# Patient Record
Sex: MALE | Race: WHITE | Employment: FULL TIME | URBAN - METROPOLITAN AREA
[De-identification: names, ages, dates, MRNs, and addresses within clinical notes are randomized per-mention and may not be internally consistent; named-entity substitution may affect disease eponyms.]

---

## 2021-04-02 ENCOUNTER — HOSPITAL ENCOUNTER (OUTPATIENT)
Age: 66
Setting detail: OBSERVATION
Discharge: HOME | End: 2021-04-03
Attending: EMERGENCY MEDICINE | Admitting: FAMILY MEDICINE
Payer: COMMERCIAL

## 2021-04-02 ENCOUNTER — APPOINTMENT (OUTPATIENT)
Dept: CT IMAGING | Age: 66
End: 2021-04-02
Attending: NURSE PRACTITIONER
Payer: COMMERCIAL

## 2021-04-02 ENCOUNTER — APPOINTMENT (OUTPATIENT)
Dept: MRI IMAGING | Age: 66
End: 2021-04-02
Attending: FAMILY MEDICINE
Payer: COMMERCIAL

## 2021-04-02 ENCOUNTER — APPOINTMENT (OUTPATIENT)
Dept: CT IMAGING | Age: 66
End: 2021-04-02
Attending: EMERGENCY MEDICINE
Payer: COMMERCIAL

## 2021-04-02 ENCOUNTER — APPOINTMENT (OUTPATIENT)
Dept: NON INVASIVE DIAGNOSTICS | Age: 66
End: 2021-04-02
Attending: FAMILY MEDICINE
Payer: COMMERCIAL

## 2021-04-02 DIAGNOSIS — G45.9 TIA (TRANSIENT ISCHEMIC ATTACK): Primary | ICD-10-CM

## 2021-04-02 LAB
ALBUMIN SERPL-MCNC: 4.1 G/DL (ref 3.5–5)
ALBUMIN/GLOB SERPL: 1.1 {RATIO} (ref 1.1–2.2)
ALP SERPL-CCNC: 151 U/L (ref 45–117)
ALT SERPL-CCNC: 38 U/L (ref 12–78)
ANION GAP SERPL CALC-SCNC: 2 MMOL/L (ref 5–15)
APTT PPP: 24.3 SEC (ref 22.1–31)
AST SERPL-CCNC: 21 U/L (ref 15–37)
BASOPHILS # BLD: 0 K/UL (ref 0–0.1)
BASOPHILS NFR BLD: 1 % (ref 0–1)
BILIRUB SERPL-MCNC: 2.4 MG/DL (ref 0.2–1)
BUN SERPL-MCNC: 20 MG/DL (ref 6–20)
BUN/CREAT SERPL: 21 (ref 12–20)
CALCIUM SERPL-MCNC: 9.1 MG/DL (ref 8.5–10.1)
CHLORIDE SERPL-SCNC: 105 MMOL/L (ref 97–108)
CO2 SERPL-SCNC: 31 MMOL/L (ref 21–32)
COMMENT, HOLDF: NORMAL
CREAT SERPL-MCNC: 0.97 MG/DL (ref 0.7–1.3)
DIFFERENTIAL METHOD BLD: ABNORMAL
EOSINOPHIL # BLD: 0.1 K/UL (ref 0–0.4)
EOSINOPHIL NFR BLD: 2 % (ref 0–7)
ERYTHROCYTE [DISTWIDTH] IN BLOOD BY AUTOMATED COUNT: 12.7 % (ref 11.5–14.5)
GLOBULIN SER CALC-MCNC: 3.6 G/DL (ref 2–4)
GLUCOSE BLD STRIP.AUTO-MCNC: 139 MG/DL (ref 65–100)
GLUCOSE BLD STRIP.AUTO-MCNC: 145 MG/DL (ref 65–100)
GLUCOSE BLD STRIP.AUTO-MCNC: 99 MG/DL (ref 65–100)
GLUCOSE SERPL-MCNC: 101 MG/DL (ref 65–100)
HCT VFR BLD AUTO: 52.6 % (ref 36.6–50.3)
HGB BLD-MCNC: 17.8 G/DL (ref 12.1–17)
IMM GRANULOCYTES # BLD AUTO: 0 K/UL (ref 0–0.04)
IMM GRANULOCYTES NFR BLD AUTO: 0 % (ref 0–0.5)
INR PPP: 1 (ref 0.9–1.1)
LYMPHOCYTES # BLD: 1.2 K/UL (ref 0.8–3.5)
LYMPHOCYTES NFR BLD: 26 % (ref 12–49)
MCH RBC QN AUTO: 30.5 PG (ref 26–34)
MCHC RBC AUTO-ENTMCNC: 33.8 G/DL (ref 30–36.5)
MCV RBC AUTO: 90.2 FL (ref 80–99)
MONOCYTES # BLD: 0.5 K/UL (ref 0–1)
MONOCYTES NFR BLD: 11 % (ref 5–13)
NEUTS SEG # BLD: 2.7 K/UL (ref 1.8–8)
NEUTS SEG NFR BLD: 60 % (ref 32–75)
NRBC # BLD: 0 K/UL (ref 0–0.01)
NRBC BLD-RTO: 0 PER 100 WBC
PLATELET # BLD AUTO: 135 K/UL (ref 150–400)
PMV BLD AUTO: 10.6 FL (ref 8.9–12.9)
POTASSIUM SERPL-SCNC: 3.9 MMOL/L (ref 3.5–5.1)
PROT SERPL-MCNC: 7.7 G/DL (ref 6.4–8.2)
PROTHROMBIN TIME: 10.2 SEC (ref 9–11.1)
RBC # BLD AUTO: 5.83 M/UL (ref 4.1–5.7)
SAMPLES BEING HELD,HOLD: NORMAL
SERVICE CMNT-IMP: ABNORMAL
SERVICE CMNT-IMP: ABNORMAL
SERVICE CMNT-IMP: NORMAL
SODIUM SERPL-SCNC: 138 MMOL/L (ref 136–145)
THERAPEUTIC RANGE,PTTT: NORMAL SECS (ref 58–77)
TROPONIN I SERPL-MCNC: <0.05 NG/ML
TROPONIN I SERPL-MCNC: <0.05 NG/ML
WBC # BLD AUTO: 4.4 K/UL (ref 4.1–11.1)

## 2021-04-02 PROCEDURE — 84484 ASSAY OF TROPONIN QUANT: CPT

## 2021-04-02 PROCEDURE — 70496 CT ANGIOGRAPHY HEAD: CPT

## 2021-04-02 PROCEDURE — 99218 HC RM OBSERVATION: CPT

## 2021-04-02 PROCEDURE — 0042T CT CODE NEURO PERF W CBF: CPT

## 2021-04-02 PROCEDURE — 36415 COLL VENOUS BLD VENIPUNCTURE: CPT

## 2021-04-02 PROCEDURE — 99285 EMERGENCY DEPT VISIT HI MDM: CPT

## 2021-04-02 PROCEDURE — 74011000636 HC RX REV CODE- 636: Performed by: RADIOLOGY

## 2021-04-02 PROCEDURE — 80053 COMPREHEN METABOLIC PANEL: CPT

## 2021-04-02 PROCEDURE — 85025 COMPLETE CBC W/AUTO DIFF WBC: CPT

## 2021-04-02 PROCEDURE — 99245 OFF/OP CONSLTJ NEW/EST HI 55: CPT | Performed by: PSYCHIATRY & NEUROLOGY

## 2021-04-02 PROCEDURE — 74011250636 HC RX REV CODE- 250/636: Performed by: FAMILY MEDICINE

## 2021-04-02 PROCEDURE — 97161 PT EVAL LOW COMPLEX 20 MIN: CPT

## 2021-04-02 PROCEDURE — 70450 CT HEAD/BRAIN W/O DYE: CPT

## 2021-04-02 PROCEDURE — 82962 GLUCOSE BLOOD TEST: CPT

## 2021-04-02 PROCEDURE — 93306 TTE W/DOPPLER COMPLETE: CPT

## 2021-04-02 PROCEDURE — 85730 THROMBOPLASTIN TIME PARTIAL: CPT

## 2021-04-02 PROCEDURE — 85610 PROTHROMBIN TIME: CPT

## 2021-04-02 PROCEDURE — 74011250637 HC RX REV CODE- 250/637: Performed by: FAMILY MEDICINE

## 2021-04-02 PROCEDURE — 97116 GAIT TRAINING THERAPY: CPT

## 2021-04-02 PROCEDURE — 74011250637 HC RX REV CODE- 250/637: Performed by: EMERGENCY MEDICINE

## 2021-04-02 RX ORDER — ACETAMINOPHEN 650 MG/1
650 SUPPOSITORY RECTAL
Status: DISCONTINUED | OUTPATIENT
Start: 2021-04-02 | End: 2021-04-03 | Stop reason: HOSPADM

## 2021-04-02 RX ORDER — ATORVASTATIN CALCIUM 20 MG/1
80 TABLET, FILM COATED ORAL
Status: DISCONTINUED | OUTPATIENT
Start: 2021-04-02 | End: 2021-04-03 | Stop reason: HOSPADM

## 2021-04-02 RX ORDER — ONDANSETRON 2 MG/ML
4 INJECTION INTRAMUSCULAR; INTRAVENOUS
Status: DISCONTINUED | OUTPATIENT
Start: 2021-04-02 | End: 2021-04-03 | Stop reason: HOSPADM

## 2021-04-02 RX ORDER — LORAZEPAM 2 MG/ML
0.5 INJECTION INTRAMUSCULAR ONCE
Status: COMPLETED | OUTPATIENT
Start: 2021-04-02 | End: 2021-04-03

## 2021-04-02 RX ORDER — SERTRALINE HYDROCHLORIDE 50 MG/1
50 TABLET, FILM COATED ORAL DAILY
Status: DISCONTINUED | OUTPATIENT
Start: 2021-04-03 | End: 2021-04-03 | Stop reason: HOSPADM

## 2021-04-02 RX ORDER — ZOLPIDEM TARTRATE 5 MG/1
2.5 TABLET ORAL
Status: DISCONTINUED | OUTPATIENT
Start: 2021-04-02 | End: 2021-04-03 | Stop reason: HOSPADM

## 2021-04-02 RX ORDER — ASPIRIN 325 MG
325 TABLET ORAL ONCE
Status: COMPLETED | OUTPATIENT
Start: 2021-04-02 | End: 2021-04-02

## 2021-04-02 RX ORDER — ACETAMINOPHEN 325 MG/1
650 TABLET ORAL
Status: DISCONTINUED | OUTPATIENT
Start: 2021-04-02 | End: 2021-04-03 | Stop reason: HOSPADM

## 2021-04-02 RX ORDER — GUAIFENESIN 100 MG/5ML
81 LIQUID (ML) ORAL DAILY
Status: DISCONTINUED | OUTPATIENT
Start: 2021-04-03 | End: 2021-04-03 | Stop reason: HOSPADM

## 2021-04-02 RX ORDER — FAMOTIDINE 20 MG/1
20 TABLET, FILM COATED ORAL EVERY 12 HOURS
Status: DISCONTINUED | OUTPATIENT
Start: 2021-04-02 | End: 2021-04-03 | Stop reason: HOSPADM

## 2021-04-02 RX ORDER — SODIUM CHLORIDE 9 MG/ML
75 INJECTION, SOLUTION INTRAVENOUS CONTINUOUS
Status: DISCONTINUED | OUTPATIENT
Start: 2021-04-02 | End: 2021-04-03 | Stop reason: HOSPADM

## 2021-04-02 RX ADMIN — ASPIRIN 325 MG ORAL TABLET 325 MG: 325 PILL ORAL at 11:55

## 2021-04-02 RX ADMIN — FAMOTIDINE 20 MG: 20 TABLET, FILM COATED ORAL at 21:28

## 2021-04-02 RX ADMIN — ZOLPIDEM TARTRATE 2.5 MG: 5 TABLET ORAL at 22:32

## 2021-04-02 RX ADMIN — IOPAMIDOL 40 ML: 755 INJECTION, SOLUTION INTRAVENOUS at 10:08

## 2021-04-02 RX ADMIN — ATORVASTATIN CALCIUM 80 MG: 20 TABLET, FILM COATED ORAL at 21:28

## 2021-04-02 RX ADMIN — FAMOTIDINE 20 MG: 20 TABLET, FILM COATED ORAL at 16:39

## 2021-04-02 RX ADMIN — SODIUM CHLORIDE 75 ML/HR: 9 INJECTION, SOLUTION INTRAVENOUS at 15:27

## 2021-04-02 RX ADMIN — IOPAMIDOL 80 ML: 755 INJECTION, SOLUTION INTRAVENOUS at 10:08

## 2021-04-02 NOTE — PROGRESS NOTES
Occupational Therapy    Chart reviewed, consulted with physical therapist, who evaluated patient and advised that he is at independent functional baseline. OT evaluation is not indicated. Will complete order.     Miguel Branham OTR/L

## 2021-04-02 NOTE — H&P
History & Physical    Primary Care Provider: Giovana Brink MD  Source of Information: Patient     History of Presenting Illness:   Joao Alejandro is a 63 y.o. male who presents with right-sided weakness    Patient reports that he has had some trouble sleeping in the last few months, takes melatonin on a daily basis. Today morning he woke up at 530, had a snack to eat, post that slept back again, woke up and started having some right-sided heaviness. Patient reports that he felt his right hand and leg were not moving the way they should, felt it to be heavy and incoordinated. Patient also reports that he had some dizziness associated with his symptoms. He got concerned and decided to come to the hospital.  Patient reports he also felt off balance this morning. Patient came to the ER and was requested to be admitted to the hospital service. Patient reports he had similar symptoms about 2 weeks ago. The patient denies any Headache, blurry vision, sore throat, trouble swallowing, trouble with speech, chest pain, SOB, cough, fever, chills, N/V/D, abd pain, urinary symptoms, constipation, recent travels, sick contacts, falls, injuries, rashes, contact with COVID-19 diagnosed patients, hematemesis, melena, hemoptysis, hematuria, rashes, denies starting any new medications and denies any other concerns or problems besides as mentioned above. Review of Systems:  Pertinent items are noted in the History of Present Illness.    All other systems reviewed, pertinent positives and negatives noted in HPI    Past Medical History:   Diagnosis Date    Anxiety     ED (erectile dysfunction)     Gilbert's syndrome     Gout     Laceration     Lactose intolerance     Plantar fasciitis     Prostatitis     Trauma     rt eye injury       Past Surgical History:   Procedure Laterality Date    ENDOSCOPY, COLON, DIAGNOSTIC  2007    flex sigmoidoscopy , at 46 [facility] HX COLONOSCOPY  04[place]15/[place]    [place] , Dr. Batsheva Bansal HX HEENT      septoplasty; sinus surgery . rt eye sgy for trauma    HX ORTHOPAEDIC  1975    rt knee open meniscectomy    HX UROLOGICAL  04/2016    TURB -T benign tumor     Prior to Admission medications    Medication Sig Start Date End Date Taking? Authorizing Provider   sertraline (ZOLOFT) 50 mg tablet Take 1 Tab by mouth daily. 7/20/18   Deric Bui IV, MD   sildenafil, antihypertensive, (REVATIO) 20 mg tablet Take 20 mg by mouth daily as needed. Provider, Historical   melatonin 3 mg tablet Take 1.5 mg by mouth. Every other night    Provider, Historical   therapeutic multivitamin (THERAGRAN) tablet Take 1 Tab by mouth daily. Provider, Historical     Allergies   Allergen Reactions    Celexa [Citalopram] Other (comments)     tinnitus    Cialis [Tadalafil] Other (comments)     Dizziness     Effexor [Venlafaxine] Other (comments)     Suppressed appetite     Levitra [Vardenafil] Other (comments)     Malaise     Prozac [Fluoxetine] Other (comments)     drowsiness    Sulfa (Sulfonamide Antibiotics) Other (comments)     Abdominal pain    Viagra [Sildenafil] Other (comments)     Tinnitus       Family History   Problem Relation Age of Onset    Heart Disease Father         Popliteal aneurysm.     Stroke Father         TIAs     Cancer Father         prostate     SOCIAL HISTORY:  Patient resides:  Independently x   Assisted Living    SNF    With family care       Smoking history:   None x   Former    Chronic      Alcohol history:   None x   Social    Chronic      Ambulates:   Independently x   w/cane    w/walker    w/wc    CODE STATUS:  DNR    Full x   Other      Objective:     Physical Exam:     Visit Vitals  /81   Pulse 65   Temp 97.1 °F (36.2 °C)   Resp 23   SpO2 97%      O2 Device: Room air    General : alert x 3, awake, no acute distress, resting in bed, pleasant male, appears to be stated age  HEENT: PEERL, EOMI, moist mucus membrane, TM clear  Neck: supple, no JVD, no meningeal signs  Chest: Clear to auscultation bilaterally   CVS: S1 S2 heard, Capillary refill less than 2 seconds  Abd: soft/ Non tender, non distended, BS physiological,   Ext: no clubbing, no cyanosis, no edema, brisk 2+ DP pulses  Neuro/Psych: pleasant mood and affect, CN 2-12 grossly intact, sensory grossly within normal limit, Strength 5/5 in all extremities, DTR 1+ x 4  Skin: warm     EKG: Normal sinus rhythm with nonspecific ST changes      Data Review:     Recent Days:  Recent Labs     04/02/21  1010   WBC 4.4   HGB 17.8*   HCT 52.6*   *     Recent Labs     04/02/21  1010      K 3.9      CO2 31   *   BUN 20   CREA 0.97   CA 9.1   ALB 4.1   ALT 38   INR 1.0     No results for input(s): PH, PCO2, PO2, HCO3, FIO2 in the last 72 hours. 24 Hour Results:  Recent Results (from the past 24 hour(s))   GLUCOSE, POC    Collection Time: 04/02/21  9:30 AM   Result Value Ref Range    Glucose (POC) 145 (H) 65 - 100 mg/dL    Performed by JAYA ARVIZU    CBC WITH AUTOMATED DIFF    Collection Time: 04/02/21 10:10 AM   Result Value Ref Range    WBC 4.4 4.1 - 11.1 K/uL    RBC 5.83 (H) 4.10 - 5.70 M/uL    HGB 17.8 (H) 12.1 - 17.0 g/dL    HCT 52.6 (H) 36.6 - 50.3 %    MCV 90.2 80.0 - 99.0 FL    MCH 30.5 26.0 - 34.0 PG    MCHC 33.8 30.0 - 36.5 g/dL    RDW 12.7 11.5 - 14.5 %    PLATELET 817 (L) 485 - 400 K/uL    MPV 10.6 8.9 - 12.9 FL    NRBC 0.0 0  WBC    ABSOLUTE NRBC 0.00 0.00 - 0.01 K/uL    NEUTROPHILS 60 32 - 75 %    LYMPHOCYTES 26 12 - 49 %    MONOCYTES 11 5 - 13 %    EOSINOPHILS 2 0 - 7 %    BASOPHILS 1 0 - 1 %    IMMATURE GRANULOCYTES 0 0.0 - 0.5 %    ABS. NEUTROPHILS 2.7 1.8 - 8.0 K/UL    ABS. LYMPHOCYTES 1.2 0.8 - 3.5 K/UL    ABS. MONOCYTES 0.5 0.0 - 1.0 K/UL    ABS. EOSINOPHILS 0.1 0.0 - 0.4 K/UL    ABS. BASOPHILS 0.0 0.0 - 0.1 K/UL    ABS. IMM.  GRANS. 0.0 0.00 - 0.04 K/UL    DF AUTOMATED     METABOLIC PANEL, COMPREHENSIVE    Collection Time: 04/02/21 10:10 AM   Result Value Ref Range    Sodium 138 136 - 145 mmol/L    Potassium 3.9 3.5 - 5.1 mmol/L    Chloride 105 97 - 108 mmol/L    CO2 31 21 - 32 mmol/L    Anion gap 2 (L) 5 - 15 mmol/L    Glucose 101 (H) 65 - 100 mg/dL    BUN 20 6 - 20 MG/DL    Creatinine 0.97 0.70 - 1.30 MG/DL    BUN/Creatinine ratio 21 (H) 12 - 20      GFR est AA >60 >60 ml/min/1.73m2    GFR est non-AA >60 >60 ml/min/1.73m2    Calcium 9.1 8.5 - 10.1 MG/DL    Bilirubin, total 2.4 (H) 0.2 - 1.0 MG/DL    ALT (SGPT) 38 12 - 78 U/L    AST (SGOT) 21 15 - 37 U/L    Alk. phosphatase 151 (H) 45 - 117 U/L    Protein, total 7.7 6.4 - 8.2 g/dL    Albumin 4.1 3.5 - 5.0 g/dL    Globulin 3.6 2.0 - 4.0 g/dL    A-G Ratio 1.1 1.1 - 2.2     PROTHROMBIN TIME + INR    Collection Time: 04/02/21 10:10 AM   Result Value Ref Range    INR 1.0 0.9 - 1.1      Prothrombin time 10.2 9.0 - 11.1 sec   PTT    Collection Time: 04/02/21 10:10 AM   Result Value Ref Range    aPTT 24.3 22.1 - 31.0 sec    aPTT, therapeutic range     58.0 - 77.0 SECS   TROPONIN I    Collection Time: 04/02/21 10:10 AM   Result Value Ref Range    Troponin-I, Qt. <0.05 <0.05 ng/mL   SAMPLES BEING HELD    Collection Time: 04/02/21 10:10 AM   Result Value Ref Range    SAMPLES BEING HELD 1red     COMMENT        Add-on orders for these samples will be processed based on acceptable specimen integrity and analyte stability, which may vary by analyte. Imaging:   Cta Code Neuro Head And Neck W Cont    Result Date: 4/2/2021  1. No significant perfusion abnormality. 2. Negative CT angiography of the neck and head. Ct Code Neuro Head Wo Contrast    Result Date: 4/2/2021  No evidence of acute process. Ct Code Neuro Perf W Cbf    Result Date: 4/2/2021  1. No significant perfusion abnormality. 2. Negative CT angiography of the neck and head.     Assessment/Plan     TIA: Patient will be observed on a telemetry bed, MRI brain, neurovascular checks, CT and CTA negative for acute pathology, neurology consult, aspirin, statin, PT OT speech consult, supportive care, close monitoring, echocardiogram and further intervention per hospitalist, reassess as needed    Insomnia: Add Ambien as needed nightly. GI DVT prophylaxis: Patient will be on SCD's             Please note that this dictation was completed with meets, the computer voice recognition software. Quite often unanticipated grammatical, syntax, homophones, and other interpretive errors are inadvertently transcribed by the computer software. Please disregard these errors. Please excuse any errors that have escaped final proofreading.          Signed By: Wilma Cagle MD     April 2, 2021

## 2021-04-02 NOTE — ADVANCED PRACTICE NURSE
Neurocritical Care Code Stroke Documentation    Symptoms:   Right arm/leg weakness and ataxia , dizziness   Last Known Well: 1030 2021   Medical hx: Active Problems:    * No active hospital problems. *     Anticoagulation: None   VAN:   Positive/Negative   NIHSS:   1a-LOC:0    1b-Month/Age:0    1c-Open/Close Hand:0    2-Best Gaze:0    3-Visual Fields:0    4-Facial Palsy:0    5a-Left Arm:0    5b-Right Arm:0    6a-Left Le    6b-Right Le    7-Limb Ataxia:0    8-Sensory:0    9-Best Language:0    10-Dysarthria:0    11-Extinction/Inattention:0  TOTAL SCORE:0   Imaging:   CT- IMPRESSION  No evidence of acute process.       CTA    CTP   Plan:   TPA Candidate: NO    Mechanical thrombectomy Candidate: NO     Discussed with: Dr. Carmelita Adan in the ED, bedside RN and patient     Patient with symptoms of right arm and leak heaviness that he woke up with at 5 AM. reprots that this also happened two weeks ago , but at 745 AM today the symptoms had not resolved so he decided to come to the ED. He also feels off balance and \"foggy. \" Recommend CTA Head/neck and CTP as well as aspirin. Time spent: 20 minutes.      Paz Acosta NP  Neurocritical Care Nurse Practitioner  [phone]

## 2021-04-02 NOTE — PROGRESS NOTES
SLP Contact Note    Consult received and appreciated. Patient chart reviewed. CT negative for acute infarct. Pt passed STAND. Will await MRI and complete evaluation if indicated.       Thank you,  Jose English M.Ed, Kenna Almanza  Speech-Language Pathologist

## 2021-04-02 NOTE — PROGRESS NOTES
Admission Medication Reconciliation: In progress:    Unable to speak with patient face to face at this time due to general isolation precautions in the ED related to COVID-19 pandemic. Patient's voicemail is full. Left voicemail for wife Kuldeep Garcia) @ [phone], awaiting return call and will update once additional information becomes available. Thank you for allowing me to participate in the care of your patient. Mark Williamson PharmD, RN # [phone]       [facility] pharmacy benefit data reflects medications filled and processed through the patient's insurance, however   this data does NOT capture whether the medication was picked up or is currently being taken by the patient. Allergies:  Celexa [citalopram], Cialis [tadalafil], Effexor [venlafaxine], Levitra [vardenafil], Prozac [fluoxetine], Sulfa (sulfonamide antibiotics), and Viagra [sildenafil]    Significant PMH/Disease States:   Past Medical History:   Diagnosis Date    Anxiety     ED (erectile dysfunction)     Gilbert's syndrome     Gout     Laceration     Lactose intolerance     Plantar fasciitis     Prostatitis     Trauma     rt eye injury      Chief Complaint for this Admission:    Chief Complaint   Patient presents with    Extremity Weakness    Dizziness     Prior to Admission Medications:   Prior to Admission Medications   Prescriptions Last Dose Informant Taking?   melatonin 3 mg tablet   No   Sig: Take 1.5 mg by mouth. Every other night   sertraline (ZOLOFT) 50 mg tablet   No   Sig: Take 1 Tab by mouth daily. sildenafil, antihypertensive, (REVATIO) 20 mg tablet   No   Sig: Take 20 mg by mouth daily as needed. therapeutic multivitamin (THERAGRAN) tablet   No   Sig: Take 1 Tab by mouth daily. Facility-Administered Medications: None     Please contact the main inpatient pharmacy with any questions or concerns at [phone] and we will direct you to the clinical pharmacist covering this patient's care while in-house. [facility], [place]

## 2021-04-02 NOTE — PROGRESS NOTES
Problem: TIA/CVA Stroke: 0-24 hours  Goal: Activity/Safety  Outcome: Progressing Towards Goal  Goal: Consults, if ordered  Outcome: Progressing Towards Goal  Goal: Diagnostic Test/Procedures  Outcome: Progressing Towards Goal  Goal: Nutrition/Diet  Outcome: Progressing Towards Goal  Goal: Discharge Planning  Outcome: Progressing Towards Goal  Goal: Medications  Outcome: Progressing Towards Goal  Goal: Respiratory  Outcome: Progressing Towards Goal  Goal: Treatments/Interventions/Procedures  Outcome: Progressing Towards Goal  Goal: Minimize risk of bleeding post-thrombolytic infusion  Outcome: Progressing Towards Goal  Goal: Monitor for complications post-thrombolytic infusion  Outcome: Progressing Towards Goal  Goal: Psychosocial  Outcome: Progressing Towards Goal  Goal: *Hemodynamically stable  Outcome: Progressing Towards Goal  Goal: *Neurologically stable  Description: Absence of additional neurological deficits    Outcome: Progressing Towards Goal  Goal: *Verbalizes anxiety and depression are reduced or absent  Outcome: Progressing Towards Goal  Goal: *Absence of Signs of Aspiration on Current Diet  Outcome: Progressing Towards Goal  Goal: *Absence of deep venous thrombosis signs and symptoms(Stroke Metric)  Outcome: Progressing Towards Goal  Goal: *Ability to perform ADLs and demonstrates progressive mobility and function  Outcome: Progressing Towards Goal  Goal: *Stroke education started(Stroke Metric)  Outcome: Progressing Towards Goal  Goal: *Dysphagia screen performed(Stroke Metric)  Outcome: Progressing Towards Goal  Goal: *Rehab consulted(Stroke Metric)  Outcome: Progressing Towards Goal

## 2021-04-02 NOTE — PROGRESS NOTES
TRANSFER - IN REPORT:    Verbal report received from NANCY Steel(name) on Mervat Schulz  being received from ED(unit) for routine progression of care      Report consisted of patients Situation, Background, Assessment and   Recommendations(SBAR). Information from the following report(s) SBAR, Kardex, ED Summary, Recent Results, Med Rec Status and Cardiac Rhythm NRS was reviewed with the receiving nurse. Opportunity for questions and clarification was provided. Assessment completed upon patients arrival to unit and care assumed.

## 2021-04-02 NOTE — PROGRESS NOTES
PHYSICAL THERAPY EVALUATION WITH DISCHARGE  Patient: Ramo Arroyo (41 y.o. male)  Date: 4/2/2021  Primary Diagnosis: TIA (transient ischemic attack) [G45.9]       Precautions:          ASSESSMENT  Based on the objective data described below, the patient presents with baseline level of independence following admission for TIA 2/2 R sided heaviness and \"clumsiness\" early this AM. Pt reports symptoms have resolved in extremities though continues to feel \"light. \" BP stable with positional changes. Independent with intact balance, full strength and no sensation or coordination deficits. Ambulates independently without AD and low fall risk. Educated on BE FAST. CT negative awaiting MRI. Functional Outcome Measure: The patient scored Total: 55/56 on the Cedeno Balance Assessment which is indicative of low fall risk. Other factors to consider for discharge: pending MRI     Further skilled acute physical therapy is not indicated at this time. PLAN :  Recommendation for discharge: (in order for the patient to meet his/her long term goals)  No skilled physical therapy/ follow up rehabilitation needs identified at this time. This discharge recommendation:  Has been made in collaboration with the attending provider and/or case management    IF patient discharges home will need the following DME: none         SUBJECTIVE:   Patient stated I just feel like I'm floating.     OBJECTIVE DATA SUMMARY:   HISTORY:    Past Medical History:   Diagnosis Date    Anxiety     ED (erectile dysfunction)     Gilbert's syndrome     Gout     Laceration     Lactose intolerance     Plantar fasciitis     Prostatitis     Trauma     rt eye injury      Past Surgical History:   Procedure Laterality Date    ENDOSCOPY, COLON, DIAGNOSTIC  2007    flex sigmoidoscopy , at 46 [facility] HX COLONOSCOPY  04/15/2016    [place] , Dr. Doug Rae HX HEENT      septoplasty; sinus surgery .  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, drives  Personal factors and/or comorbidities impacting plan of care: gout    Home Situation  Home Environment: Private residence  # Steps to Enter: 3  Rails to Enter: No  One/Two Story Residence: One story  Living Alone: No  Support Systems: Spouse/Significant Other/Partner  Patient Expects to be Discharged to[de-identified] Private residence  Current DME Used/Available at Home: None  Tub or Shower Type: Tub    EXAMINATION/PRESENTATION/DECISION MAKING:   Critical Behavior:  Neurologic State: Alert  Orientation Level: Oriented X4  Cognition: Appropriate decision making, Appropriate for age attention/concentration, Appropriate safety awareness     Hearing:     Skin:    Edema:   Range Of Motion:  AROM: Within functional limits           PROM: Within functional limits           Strength:    Strength: Within functional limits                    Tone & Sensation:   Tone: Normal              Sensation: Intact               Coordination:  Coordination: Within functional limits  Vision:      Functional Mobility:  Bed Mobility:              Transfers:  Sit to Stand: Independent  Stand to Sit: Independent  Stand Pivot Transfers: Independent                    Balance:   Sitting: Intact  Standing: Intact  Ambulation/Gait Training:  Distance (ft): 300 Feet (ft)     Ambulation - Level of Assistance: Independent                                                Stairs:               Therapeutic Exercises:       Functional Measure  Cedeno Balance Test:    Sitting to Standin  Standing Unsupported: 4  Sitting with Back Unsupported: 4  Standing to Sittin  Transfers: 4  Standing Unsupported with Eyes Closed: 4  Standing Unsupported with Feet Together: 4  Reach Forward with Outstretched Arm: 4   Object: 4  Turn to Look Over Shoulders: 4  Turn 360 Degrees: 4  Alternate Foot on Step/Stool: 4  Standing Unsupported One Foot in Front: 4  Stand on One Leg: 3  Total: 55/56         56=Maximum possible score;   0-20=High fall risk  21-40=Moderate fall risk   41-56=Low fall risk        Physical Therapy Evaluation Charge Determination   History Examination Presentation Decision-Making   MEDIUM  Complexity : 1-2 comorbidities / personal factors will impact the outcome/ POC  MEDIUM Complexity : 3 Standardized tests and measures addressing body structure, function, activity limitation and / or participation in recreation  LOW Complexity : Stable, uncomplicated  Other outcome measures Cedeno  LOW       Based on the above components, the patient evaluation is determined to be of the following complexity level: LOW     Pain Rating:  none    Activity Tolerance:   Good    After treatment patient left in no apparent distress:   Sitting in chair and Call bell within reach    COMMUNICATION/EDUCATION:   The patients plan of care was discussed with: Registered nurse. Patient was educated regarding His deficit(s) of heaviness in R side as this relates to His diagnosis of TIA. He demonstrated Excellent understanding as evidenced by recall. Patient and/or family was verbally educated on the BE FAST acronym for signs/symptoms of CVA and TIA. BE FAST was written on patient's communication board  for visual education and reinforcement. All questions answered with patient indicating excellent understanding. Fall prevention education was provided and the patient/caregiver indicated understanding., Patient/family have participated as able in goal setting and plan of care. and Patient/family agree to work toward stated goals and plan of care.     Thank you for this referral.  Melva Sena, PT   Time Calculation: 20 mins

## 2021-04-02 NOTE — CONSULTS
INPATIENT NEUROLOGY CONSULTATION  4/2/2021     Consulted by: Radha Saldivar MD        Patient ID:  Kendrick Anderson  [id number]  63 y.o.  1955    Chief Complaint   Patient presents with    Extremity Weakness    Dizziness       HPI    Mr. Wu Gillespie is a 60-year-old gentleman with a history of anxiety and testosterone deficiency here because this morning he noticed when he was awake in the middle night the right arm and leg did not want to work very well. It was kind of clumsy. He went back to sleep and when he woke up again he was still feeling this and also had some dizziness which is unusual.  He got up and noticed his right side was heavy. He had no speech change. No difficulty with vision. He came to the hospital.  Since being here the symptoms are better. Not resolved. He says 2 weeks ago he had a very similar event but no dizziness and less intense and resolved after short period of time but he cannot tell me how long. He does not take aspirin. CT CTA unrevealing at the moment. Non-smoker, 1 beer nightly. 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 Review of Systems   Eyes: Negative for double vision. Neurological: Positive for dizziness and focal weakness. Negative for headaches. All other systems reviewed and are negative. Past Medical History:   Diagnosis Date    Anxiety     ED (erectile dysfunction)     Gilbert's syndrome     Gout     Laceration     Lactose intolerance     Plantar fasciitis     Prostatitis     Trauma     rt eye injury      Family History   Problem Relation Age of Onset    Heart Disease Father         Popliteal aneurysm.     Stroke Father         TIAs     Cancer Father         prostate     Social History     Socioeconomic History    Marital status:      Spouse name: Not on file    Number of children: Not on file    Years of education: Not on file    Highest education level: Not on file   Occupational History    Not on file   Social Needs    Financial resource strain: Not on file    Food insecurity     Worry: Not on file     Inability: Not on file    Transportation needs     Medical: Not on file     Non-medical: Not on file   Tobacco Use    Smoking status: Never Smoker    Smokeless tobacco: Never Used   Substance and Sexual Activity    Alcohol use: Yes     Comment: 1 beer /month    Drug use: No    Sexual activity: Not on file   Lifestyle    Physical activity     Days per week: Not on file     Minutes per session: Not on file    Stress: Not on file   Relationships    Social connections     Talks on phone: Not on file     Gets together: Not on file     Attends Rastafari service: Not on file     Active member of club or organization: Not on file     Attends meetings of clubs or organizations: Not on file     Relationship status: Not on file    Intimate partner violence     Fear of current or ex partner: Not on file     Emotionally abused: Not on file     Physically abused: Not on file     Forced sexual activity: Not on file   Other Topics Concern    Not on file   Social History Narrative    Not on file     Current Facility-Administered Medications   Medication Dose Route Frequency    [START ON 4/3/2021] sertraline (ZOLOFT) tablet 50 mg  50 mg Oral DAILY    acetaminophen (TYLENOL) tablet 650 mg  650 mg Oral Q4H PRN    Or    acetaminophen (TYLENOL) solution 650 mg  650 mg Per NG tube Q4H PRN    Or    acetaminophen (TYLENOL) suppository 650 mg  650 mg Rectal Q4H PRN    0.9% sodium chloride infusion  75 mL/hr IntraVENous CONTINUOUS    ondansetron (ZOFRAN) injection 4 mg  4 mg IntraVENous Q6H PRN    [START ON 4/3/2021] aspirin chewable tablet 81 mg  81 mg Oral DAILY    atorvastatin (LIPITOR) tablet 80 mg  80 mg Oral QHS    famotidine (PEPCID) tablet 20 mg  20 mg Oral Q12H    zolpidem (AMBIEN) tablet 2.5 mg  2.5 mg Oral QHS PRN     Current Outpatient Medications   Medication Sig    sertraline (ZOLOFT) 50 mg tablet Take 1 Tab by mouth daily.  sildenafil, antihypertensive, (REVATIO) 20 mg tablet Take 20 mg by mouth daily as needed.  melatonin 3 mg tablet Take 1.5 mg by mouth. Every other night    therapeutic multivitamin (THERAGRAN) tablet Take 1 Tab by mouth daily. Allergies   Allergen Reactions    Celexa [Citalopram] Other (comments)     tinnitus    Cialis [Tadalafil] Other (comments)     Dizziness     Effexor [Venlafaxine] Other (comments)     Suppressed appetite     Levitra [Vardenafil] Other (comments)     Malaise     Prozac [Fluoxetine] Other (comments)     drowsiness    Sulfa (Sulfonamide Antibiotics) Other (comments)     Abdominal pain    Viagra [Sildenafil] Other (comments)     Tinnitus        Visit Vitals  /81   Pulse 65   Temp 97.1 °F (36.2 °C)   Resp 23   SpO2 97%     Physical Exam   Constitutional: He appears well-developed and well-nourished. Cardiovascular: Normal rate. Vitals reviewed. Neurologic Exam     Mental Status   Adult gentleman in bed awake and alert. Follows commands well.   Pupils are equal, EOMI without nystagmus  Face is grossly symmetric on smile tongue is midline speech is clear without aphasia  Strength is 5/5 on the left but 4+ on the right arm and leg  Sensation grossly intact  Finger-to-nose intact bilaterally  Gait steady            Lab Results   Component Value Date/Time    WBC 4.4 04/02/2021 10:10 AM    HGB 17.8 (H) 04/02/2021 10:10 AM    HCT 52.6 (H) 04/02/2021 10:10 AM    PLATELET 190 (L) 41/00/8446 10:10 AM    MCV 90.2 04/02/2021 10:10 AM     Lab Results   Component Value Date/Time    Glucose 101 (H) 04/02/2021 10:10 AM    Glucose (POC) 145 (H) 04/02/2021 09:30 AM    LDL, calculated 93 04/02/2015 03:25 PM    Creatinine 0.97 04/02/2021 10:10 AM      Lab Results   Component Value Date/Time    Cholesterol, total 168 04/02/2015 03:25 PM    HDL Cholesterol 51 04/02/2015 03:25 PM    LDL, calculated 93 04/02/2015 03:25 PM    Triglyceride 122 04/02/2015 03:25 PM Lab Results   Component Value Date/Time    ALT (SGPT) 38 04/02/2021 10:10 AM    Alk. phosphatase 151 (H) 04/02/2021 10:10 AM    Bilirubin, direct 0.29 10/05/2016 01:42 PM    Bilirubin, total 2.4 (H) 04/02/2021 10:10 AM    Albumin 4.1 04/02/2021 10:10 AM    Protein, total 7.7 04/02/2021 10:10 AM    INR 1.0 04/02/2021 10:10 AM    Prothrombin time 10.2 04/02/2021 10:10 AM    PLATELET 837 (L) 34/14/2244 10:10 AM        CT Results (maximum last 3): Results from Hospital Encounter encounter on 04/02/21   CT CODE NEURO PERF W CBF    Narrative EXAM:  CTA CODE NEURO HEAD AND NECK W CONT, CT CODE NEURO PERF W CBF    INDICATION:  level 2 stroke    COMPARISON:  Noncontrast head CT of earlier in the day    TECHNIQUE:    Multislice helical axial CT angiography was performed from the aortic arch to  the top of the head during uneventful rapid bolus intravenous administration of  120 mL of Isovue 370. Postprocessing was performed to include MIP and  reformatted images. Standard images of the head were also obtained following  contrast administration and a delay. CT brain perfusion was performed with generation of hemodynamic maps of multiple  parameters, including cerebral blood flow, cerebral volume, Tmax and MTT (mean  transit time). This study was analyzed by the [address] ai algorithm. CT dose reduction was achieved through the use of a standardized protocol  tailored for this examination and automatic exposure control for dose  modulation. FINDINGS:    CT PERFUSION:  Perfusion images are compromised by patient motion. There is no significant area  of abnormal perfusion. Artifactual areas of elevated T. Max are seen in the  right temporal and left parietal lobes. rCBF < 30% = 0 cc. Tmax > 6 seconds = 9 cc. Head CT:  The ventricles are normal in size and midline. .  There is no intracranial  hemorrhage or evolving infarct. . There is no abnormal enhancement.  . .    CTA NECK:    Great vessels: Normal arch anatomy with the origins patent. Right subclavian artery: Patent    Left subclavian artery: Patent     Right common carotid artery: Patent     Left common carotid artery: Patent     Cervical right internal carotid artery: Patent with no significant stenosis by  NASCET criteria. Cervical left internal carotid artery: Patent with no significant stenosis by  NASCET criteria. Right vertebral artery: Patent and dominant. Left vertebral artery: Patent. Developmentally small. The lung apices are clear. The thyroid is homogeneous. No cervical  lymphadenopathy. CTA HEAD:    Right cavernous internal carotid artery: Patent    Left cavernous internal carotid artery: Patent    Anterior cerebral arteries: Patent    Anterior communicating artery: Patent    Right middle cerebral artery: Patent    Left middle cerebral artery: Patent    Posterior cerebral arteries: Patent    Posterior communicating arteries: Bilaterally patent. Small in size. Basilar artery: Patent    Distal vertebral arteries: Bilaterally patent. Right vertebral artery is  dominant and left vertebral artery has a small contribution to the basilar  artery. Major venous sinus and deep venous system: Patent. Aneurysm: None. Impression 1. No significant perfusion abnormality. 2. Negative CT angiography of the neck and head. CTA CODE NEURO HEAD AND NECK W CONT    Narrative EXAM:  CTA CODE NEURO HEAD AND NECK W CONT, CT CODE NEURO PERF W CBF    INDICATION:  level 2 stroke    COMPARISON:  Noncontrast head CT of earlier in the day    TECHNIQUE:    Multislice helical axial CT angiography was performed from the aortic arch to  the top of the head during uneventful rapid bolus intravenous administration of  120 mL of Isovue 370. Postprocessing was performed to include MIP and  reformatted images. Standard images of the head were also obtained following  contrast administration and a delay.     CT brain perfusion was performed with generation of hemodynamic maps of multiple  parameters, including cerebral blood flow, cerebral volume, Tmax and MTT (mean  transit time). This study was analyzed by the [address] ai algorithm. CT dose reduction was achieved through the use of a standardized protocol  tailored for this examination and automatic exposure control for dose  modulation. FINDINGS:    CT PERFUSION:  Perfusion images are compromised by patient motion. There is no significant area  of abnormal perfusion. Artifactual areas of elevated T. Max are seen in the  right temporal and left parietal lobes. rCBF < 30% = 0 cc. Tmax > 6 seconds = 9 cc. Head CT:  The ventricles are normal in size and midline. .  There is no intracranial  hemorrhage or evolving infarct. . There is no abnormal enhancement. . .    CTA NECK:    Great vessels: Normal arch anatomy with the origins patent. Right subclavian artery: Patent    Left subclavian artery: Patent     Right common carotid artery: Patent     Left common carotid artery: Patent     Cervical right internal carotid artery: Patent with no significant stenosis by  NASCET criteria. Cervical left internal carotid artery: Patent with no significant stenosis by  NASCET criteria. Right vertebral artery: Patent and dominant. Left vertebral artery: Patent. Developmentally small. The lung apices are clear. The thyroid is homogeneous. No cervical  lymphadenopathy. CTA HEAD:    Right cavernous internal carotid artery: Patent    Left cavernous internal carotid artery: Patent    Anterior cerebral arteries: Patent    Anterior communicating artery: Patent    Right middle cerebral artery: Patent    Left middle cerebral artery: Patent    Posterior cerebral arteries: Patent    Posterior communicating arteries: Bilaterally patent. Small in size. Basilar artery: Patent    Distal vertebral arteries: Bilaterally patent.  Right vertebral artery is  dominant and left vertebral artery has a small contribution to the basilar  artery. Major venous sinus and deep venous system: Patent. Aneurysm: None. Impression 1. No significant perfusion abnormality. 2. Negative CT angiography of the neck and head. MRI Results (maximum last 3): No results found for this or any previous visit. VAS/US/Carotid Doppler Results (maximum last 3): No results found for this or any previous visit. PET Results (maximum last 3): No results found for this or any previous visit. Assessment and Plan        82-year-old gentleman who presents with right-sided weakness that still is evident on exam.  Difficult to call this TIA given that he is still symptomatic. High concern for some type of acute ischemia. MRI brain should be done. Baby aspirin daily. Stroke work-up with telemetry and echo and lipid panel. Any acute changes activate code stroke again. I spoke with both he and his wife at the bedside. Neurology will follow up. During this evaluation, we also discussed stroke education to include signs and symptoms of stroke and TIA. This clinical note was dictated with an electronic dictation software that can make unintentional errors. If there are any questions, please contact me directly for clarification.       [address] [facility], DO  NEUROLOGIST  Diplomate JOSE  4/2/2021

## 2021-04-02 NOTE — ED PROVIDER NOTES
Date: 4/2/2021  Patient Name: Massimo Lopez    History of Presenting Illness     Chief Complaint   Patient presents with    Extremity Weakness    Dizziness       History Provided By: Patient    HPI: Massimo Lopez, 63 y.o. male with a past medical history of No significant past medical history presents to the ED with cc of R-sided weakness and dizziness. Last known well was 10:30 PM last night when he went to bed. He states he woke up at 5 AM this morning and experienced right-sided heaviness. He states he was feeling \"wobbly\" and that he can get his balance. He denies any spinning sensation, headache or vision changes. He states that he was dragging his right foot and felt off balance when he ambulated, but denies any speech changes or sensory changes. He denies any new medications. He states that a similar episode happened to him 2 weeks ago but resolved spontaneously on its own. There are no other complaints, changes, or physical findings at this time. PCP: Kishore Messina MD    No current facility-administered medications on file prior to encounter. Current Outpatient Medications on File Prior to Encounter   Medication Sig Dispense Refill    sertraline (ZOLOFT) 50 mg tablet Take 1 Tab by mouth daily. 30 Tab 6    sildenafil, antihypertensive, (REVATIO) 20 mg tablet Take 20 mg by mouth daily as needed.  melatonin 3 mg tablet Take 1.5 mg by mouth. Every other night      therapeutic multivitamin (THERAGRAN) tablet Take 1 Tab by mouth daily.            Past History     Past Medical History:  Past Medical History:   Diagnosis Date    Anxiety     ED (erectile dysfunction)     Gilbert's syndrome     Gout     Laceration     Lactose intolerance     Plantar fasciitis     Prostatitis     Trauma     rt eye injury        Past Surgical History:  Past Surgical History:   Procedure Laterality Date    ENDOSCOPY, COLON, DIAGNOSTIC  2007    flex sigmoidoscopy , at [address] [facility] HX COLONOSCOPY  04/15/2016    [place] , f/u 8 y , Dr. Solo Licea HX HEENT      septoplasty; sinus surgery . rt eye sgy for trauma    HX ORTHOPAEDIC  1975    rt knee open meniscectomy    HX UROLOGICAL  04/2016    TURB -T benign tumor       Family History:  Family History   Problem Relation Age of Onset    Heart Disease Father         Popliteal aneurysm.  Stroke Father         TIAs     Cancer Father         prostate       Social History:  Social History     Tobacco Use    Smoking status: Never Smoker    Smokeless tobacco: Never Used   Substance Use Topics    Alcohol use: Yes     Comment: 1 beer /month    Drug use: No       Allergies: Allergies   Allergen Reactions    Celexa [Citalopram] Other (comments)     tinnitus    Cialis [Tadalafil] Other (comments)     Dizziness     Effexor [Venlafaxine] Other (comments)     Suppressed appetite     Levitra [Vardenafil] Other (comments)     Malaise     Prozac [Fluoxetine] Other (comments)     drowsiness    Sulfa (Sulfonamide Antibiotics) Other (comments)     Abdominal pain    Viagra [Sildenafil] Other (comments)     Tinnitus          Review of Systems   Review of Systems   Constitutional: Negative for chills, fatigue and fever. HENT: Negative. Eyes: Negative for visual disturbance. Respiratory: Negative for shortness of breath. Cardiovascular: Negative for chest pain. Gastrointestinal: Negative for abdominal pain, constipation, diarrhea, nausea and vomiting. Endocrine: Negative. Genitourinary: Negative. Musculoskeletal: Negative. Skin: Negative. Neurological: Positive for dizziness and weakness. Negative for facial asymmetry, speech difficulty, light-headedness, numbness and headaches. Psychiatric/Behavioral: Negative. Physical Exam   Physical Exam  Vitals signs and nursing note reviewed. Constitutional:       General: He is not in acute distress. Appearance: He is well-developed.    HENT:      Head: Normocephalic and atraumatic. Eyes:      Conjunctiva/sclera: Conjunctivae normal.   Neck:      Musculoskeletal: Neck supple. Vascular: No JVD. Trachea: No tracheal deviation. Cardiovascular:      Rate and Rhythm: Normal rate and regular rhythm. Heart sounds: No murmur. No friction rub. No gallop. Pulmonary:      Effort: Pulmonary effort is normal. No respiratory distress. Breath sounds: Normal breath sounds. No stridor. No wheezing. Abdominal:      General: Bowel sounds are normal. There is no distension. Palpations: Abdomen is soft. There is no mass. Tenderness: There is no abdominal tenderness. There is no guarding. Musculoskeletal: Normal range of motion. General: No tenderness. Comments: No deformity   Skin:     General: Skin is warm and dry. Findings: No rash. Neurological:      General: No focal deficit present. Mental Status: He is alert and oriented to person, place, and time. Mental status is at baseline. Cranial Nerves: No cranial nerve deficit. Sensory: No sensory deficit. Motor: No weakness. Coordination: Coordination normal.      Comments: No focal deficits   Psychiatric:         Behavior: Behavior normal.         Thought Content:  Thought content normal.         Judgment: Judgment normal.         Diagnostic Study Results     Labs -     Recent Results (from the past 72 hour(s))   GLUCOSE, POC    Collection Time: 04/02/21  9:30 AM   Result Value Ref Range    Glucose (POC) 145 (H) 65 - 100 mg/dL    Performed by JAYA ARVIZU    CBC WITH AUTOMATED DIFF    Collection Time: 04/02/21 10:10 AM   Result Value Ref Range    WBC 4.4 4.1 - 11.1 K/uL    RBC 5.83 (H) 4.10 - 5.70 M/uL    HGB 17.8 (H) 12.1 - 17.0 g/dL    HCT 52.6 (H) 36.6 - 50.3 %    MCV 90.2 80.0 - 99.0 FL    MCH 30.5 26.0 - 34.0 PG    MCHC 33.8 30.0 - 36.5 g/dL    RDW 12.7 11.5 - 14.5 %    PLATELET 125 (L) 514 - 400 K/uL    MPV 10.6 8.9 - 12.9 FL    NRBC 0.0 0  WBC    ABSOLUTE NRBC 0.00 0.00 - 0.01 K/uL    NEUTROPHILS 60 32 - 75 %    LYMPHOCYTES 26 12 - 49 %    MONOCYTES 11 5 - 13 %    EOSINOPHILS 2 0 - 7 %    BASOPHILS 1 0 - 1 %    IMMATURE GRANULOCYTES 0 0.0 - 0.5 %    ABS. NEUTROPHILS 2.7 1.8 - 8.0 K/UL    ABS. LYMPHOCYTES 1.2 0.8 - 3.5 K/UL    ABS. MONOCYTES 0.5 0.0 - 1.0 K/UL    ABS. EOSINOPHILS 0.1 0.0 - 0.4 K/UL    ABS. BASOPHILS 0.0 0.0 - 0.1 K/UL    ABS. IMM. GRANS. 0.0 0.00 - 0.04 K/UL    DF AUTOMATED     METABOLIC PANEL, COMPREHENSIVE    Collection Time: 04/02/21 10:10 AM   Result Value Ref Range    Sodium 138 136 - 145 mmol/L    Potassium 3.9 3.5 - 5.1 mmol/L    Chloride 105 97 - 108 mmol/L    CO2 31 21 - 32 mmol/L    Anion gap 2 (L) 5 - 15 mmol/L    Glucose 101 (H) 65 - 100 mg/dL    BUN 20 6 - 20 MG/DL    Creatinine 0.97 0.70 - 1.30 MG/DL    BUN/Creatinine ratio 21 (H) 12 - 20      GFR est AA >60 >60 ml/min/1.73m2    GFR est non-AA >60 >60 ml/min/1.73m2    Calcium 9.1 8.5 - 10.1 MG/DL    Bilirubin, total 2.4 (H) 0.2 - 1.0 MG/DL    ALT (SGPT) 38 12 - 78 U/L    AST (SGOT) 21 15 - 37 U/L    Alk. phosphatase 151 (H) 45 - 117 U/L    Protein, total 7.7 6.4 - 8.2 g/dL    Albumin 4.1 3.5 - 5.0 g/dL    Globulin 3.6 2.0 - 4.0 g/dL    A-G Ratio 1.1 1.1 - 2.2     PROTHROMBIN TIME + INR    Collection Time: 04/02/21 10:10 AM   Result Value Ref Range    INR 1.0 0.9 - 1.1      Prothrombin time 10.2 9.0 - 11.1 sec   PTT    Collection Time: 04/02/21 10:10 AM   Result Value Ref Range    aPTT 24.3 22.1 - 31.0 sec    aPTT, therapeutic range     58.0 - 77.0 SECS   TROPONIN I    Collection Time: 04/02/21 10:10 AM   Result Value Ref Range    Troponin-I, Qt. <0.05 <0.05 ng/mL   SAMPLES BEING HELD    Collection Time: 04/02/21 10:10 AM   Result Value Ref Range    SAMPLES BEING HELD 1red     COMMENT        Add-on orders for these samples will be processed based on acceptable specimen integrity and analyte stability, which may vary by analyte.    TROPONIN I    Collection Time: 04/02/21  4:41 PM   Result Value Ref Range    Troponin-I, Qt. <0.05 <0.05 ng/mL   GLUCOSE, POC    Collection Time: 04/02/21  7:37 PM   Result Value Ref Range    Glucose (POC) 99 65 - 100 mg/dL    Performed by Param Ravi    GLUCOSE, POC    Collection Time: 04/02/21 10:29 PM   Result Value Ref Range    Glucose (POC) 139 (H) 65 - 100 mg/dL    Performed by YESICA Hussein RN    TROPONIN I    Collection Time: 04/03/21 12:08 AM   Result Value Ref Range    Troponin-I, Qt. <0.05 <0.05 ng/mL   LIPID PANEL    Collection Time: 04/03/21 12:08 AM   Result Value Ref Range    LIPID PROFILE          Cholesterol, total 154 <200 MG/DL    Triglyceride 236 (H) <150 MG/DL    HDL Cholesterol 44 MG/DL    LDL, calculated 62.8 0 - 100 MG/DL    VLDL, calculated 47.2 MG/DL    CHOL/HDL Ratio 3.5 0.0 - 5.0     HEMOGLOBIN A1C WITH EAG    Collection Time: 04/03/21 12:08 AM   Result Value Ref Range    Hemoglobin A1c 5.2 4.0 - 5.6 %    Est. average glucose 103 mg/dL   CBC W/O DIFF    Collection Time: 04/03/21 12:08 AM   Result Value Ref Range    WBC 4.6 4.1 - 11.1 K/uL    RBC 5.27 4. 10 - 5.70 M/uL    HGB 16.3 12.1 - 17.0 g/dL    HCT 47.7 36.6 - 50.3 %    MCV 90.5 80.0 - 99.0 FL    MCH 30.9 26.0 - 34.0 PG    MCHC 34.2 30.0 - 36.5 g/dL    RDW 12.2 11.5 - 14.5 %    PLATELET 981 (L) 778 - 400 K/uL    MPV 10.8 8.9 - 12.9 FL    NRBC 0.0 0  WBC    ABSOLUTE NRBC 0.00 0.00 - 0.01 K/uL   TSH 3RD GENERATION    Collection Time: 04/03/21 12:08 AM   Result Value Ref Range    TSH 2.03 0.36 - 3.74 uIU/mL   SAMPLES BEING HELD    Collection Time: 04/03/21 12:12 AM   Result Value Ref Range    SAMPLES BEING HELD 1PST     COMMENT        Add-on orders for these samples will be processed based on acceptable specimen integrity and analyte stability, which may vary by analyte.    GLUCOSE, POC    Collection Time: 04/03/21  7:49 AM   Result Value Ref Range    Glucose (POC) 115 (H) 65 - 100 mg/dL    Performed by GIA Gray    Collection Time: 04/03/21 11:33 AM   Result Value Ref Range    Glucose (POC) 93 65 - 100 mg/dL    Performed by Harper Holder        Radiologic Studies -   CT CODE NEURO HEAD WO CONTRAST   Final Result   No evidence of acute process. CTA CODE NEURO HEAD AND NECK W CONT    (Results Pending)   CT CODE NEURO PERF W CBF    (Results Pending)     CT Results  (Last 48 hours)               04/02/21 0944  CT CODE NEURO HEAD WO CONTRAST Final result    Impression:  No evidence of acute process. Narrative:  EXAM: CT CODE NEURO HEAD WO CONTRAST       INDICATION: dizziness, right side weakness lkw 10 pm last night       COMPARISON: None. CONTRAST: None. TECHNIQUE: Unenhanced CT of the head was performed using 5 mm images. Brain and   bone windows were generated. Coronal and sagittal reformats. CT dose reduction   was achieved through use of a standardized protocol tailored for this   examination and automatic exposure control for dose modulation. FINDINGS:   The ventricles and sulci are normal in size, shape and configuration. There is   mild white matter disease likely to chronic small vessel ischemic disease. There   is no intracranial hemorrhage, extra-axial collection, or mass effect. The   basilar cisterns are open. No CT evidence of acute infarct. The bone windows demonstrate no abnormalities. A mucus retention cyst is seen in   the inferior right maxillary sinus. .               CXR Results  (Last 48 hours)    None          Medical Decision Making   I am the first provider for this patient. I reviewed the vital signs, available nursing notes, past medical history, past surgical history, family history and social history. Vital Signs-Reviewed the patient's vital signs.   Patient Vitals for the past 12 hrs:   Temp Pulse Resp BP SpO2   04/02/21 0930 (!) 96.2 °F (35.7 °C) 75 18 (!) 181/87 98 %           Records Reviewed: Nursing Notes and Old Medical Records    Provider Notes (Medical Decision Making):   Patient is a 53-year-old male, presenting with new onset right-sided weakness. Patient was a level 2 code stroke. CT scan ordered, teleneuro consulted, labs ordered. Blood sugar was normal. NIH 0.     ED Course:   Initial assessment performed. The patients presenting problems have been discussed, and they are in agreement with the care plan formulated and outlined with them. I have encouraged them to ask questions as they arise throughout their visit. CONSULTS  11:53 AM    Donny Mane DO spoke with Dr. Ruffin Kanner, Consult for Hospitalist. Discussed available diagnostic tests and clinical findings. He/She is in agreement with care plans as outlined. He/she will evaluate the patient for admission        [facility] Consult for Admission  11:52 AM      ED Room Number: ER04/04  Patient Name and age:  Hieu Nogueira 63 y.o.  male  Working Diagnosis:   1. TIA (transient ischemic attack)        COVID-19 Suspicion:  no  Sepsis present:  no  Reassessment needed: no  Code Status:  Full Code  Readmission: no  Isolation Requirements:  no  Recommended Level of Care:  med/surg  Department:[facility] Adult ED - 21   Other:  Dr. Ruffin Kanner        Disposition:  Admitted to Observation Unit the case was discussed with the admitting physician Dr. Ruffin Kanner        Diagnosis     Clinical Impression:   1. TIA (transient ischemic attack)        Attestations:    oDnny Mane DO    Please note that this dictation was completed with [employer], the computer voice recognition software. Quite often unanticipated grammatical, syntax, homophones, and other interpretive errors are inadvertently transcribed by the computer software. Please disregard these errors. Please excuse any errors that have escaped final proofreading. Thank you.

## 2021-04-03 ENCOUNTER — APPOINTMENT (OUTPATIENT)
Dept: MRI IMAGING | Age: 66
End: 2021-04-03
Attending: FAMILY MEDICINE
Payer: COMMERCIAL

## 2021-04-03 VITALS
HEART RATE: 80 BPM | DIASTOLIC BLOOD PRESSURE: 70 MMHG | OXYGEN SATURATION: 96 % | RESPIRATION RATE: 16 BRPM | SYSTOLIC BLOOD PRESSURE: 131 MMHG | TEMPERATURE: 98.6 F

## 2021-04-03 PROBLEM — G45.9 TIA (TRANSIENT ISCHEMIC ATTACK): Status: RESOLVED | Noted: 2021-04-02 | Resolved: 2021-04-03

## 2021-04-03 LAB
CHOLEST SERPL-MCNC: 154 MG/DL
COMMENT, HOLDF: NORMAL
ERYTHROCYTE [DISTWIDTH] IN BLOOD BY AUTOMATED COUNT: 12.2 % (ref 11.5–14.5)
EST. AVERAGE GLUCOSE BLD GHB EST-MCNC: 103 MG/DL
GLUCOSE BLD STRIP.AUTO-MCNC: 115 MG/DL (ref 65–100)
GLUCOSE BLD STRIP.AUTO-MCNC: 93 MG/DL (ref 65–100)
HBA1C MFR BLD: 5.2 % (ref 4–5.6)
HCT VFR BLD AUTO: 47.7 % (ref 36.6–50.3)
HDLC SERPL-MCNC: 44 MG/DL
HDLC SERPL: 3.5 {RATIO} (ref 0–5)
HGB BLD-MCNC: 16.3 G/DL (ref 12.1–17)
LDLC SERPL CALC-MCNC: 62.8 MG/DL (ref 0–100)
LIPID PROFILE,FLP: ABNORMAL
MCH RBC QN AUTO: 30.9 PG (ref 26–34)
MCHC RBC AUTO-ENTMCNC: 34.2 G/DL (ref 30–36.5)
MCV RBC AUTO: 90.5 FL (ref 80–99)
NRBC # BLD: 0 K/UL (ref 0–0.01)
NRBC BLD-RTO: 0 PER 100 WBC
PLATELET # BLD AUTO: 147 K/UL (ref 150–400)
PMV BLD AUTO: 10.8 FL (ref 8.9–12.9)
RBC # BLD AUTO: 5.27 M/UL (ref 4.1–5.7)
SAMPLES BEING HELD,HOLD: NORMAL
SERVICE CMNT-IMP: ABNORMAL
SERVICE CMNT-IMP: NORMAL
TRIGL SERPL-MCNC: 236 MG/DL (ref ?–150)
TROPONIN I SERPL-MCNC: <0.05 NG/ML
TSH SERPL DL<=0.05 MIU/L-ACNC: 2.03 UIU/ML (ref 0.36–3.74)
VLDLC SERPL CALC-MCNC: 47.2 MG/DL
WBC # BLD AUTO: 4.6 K/UL (ref 4.1–11.1)

## 2021-04-03 PROCEDURE — 74011250636 HC RX REV CODE- 250/636: Performed by: FAMILY MEDICINE

## 2021-04-03 PROCEDURE — 70551 MRI BRAIN STEM W/O DYE: CPT

## 2021-04-03 PROCEDURE — 74011250637 HC RX REV CODE- 250/637: Performed by: FAMILY MEDICINE

## 2021-04-03 PROCEDURE — 83036 HEMOGLOBIN GLYCOSYLATED A1C: CPT

## 2021-04-03 PROCEDURE — 84484 ASSAY OF TROPONIN QUANT: CPT

## 2021-04-03 PROCEDURE — 36415 COLL VENOUS BLD VENIPUNCTURE: CPT

## 2021-04-03 PROCEDURE — 85027 COMPLETE CBC AUTOMATED: CPT

## 2021-04-03 PROCEDURE — 82962 GLUCOSE BLOOD TEST: CPT

## 2021-04-03 PROCEDURE — 99218 HC RM OBSERVATION: CPT

## 2021-04-03 PROCEDURE — 74011250636 HC RX REV CODE- 250/636: Performed by: NURSE PRACTITIONER

## 2021-04-03 PROCEDURE — 80061 LIPID PANEL: CPT

## 2021-04-03 PROCEDURE — 96374 THER/PROPH/DIAG INJ IV PUSH: CPT

## 2021-04-03 PROCEDURE — 84443 ASSAY THYROID STIM HORMONE: CPT

## 2021-04-03 RX ORDER — GUAIFENESIN 100 MG/5ML
81 LIQUID (ML) ORAL DAILY
Qty: 30 TAB | Refills: 0 | Status: SHIPPED | OUTPATIENT
Start: 2021-04-04 | End: 2021-05-04

## 2021-04-03 RX ORDER — LORATADINE 10 MG/1
10 TABLET ORAL DAILY
Status: DISCONTINUED | OUTPATIENT
Start: 2021-04-04 | End: 2021-04-03 | Stop reason: HOSPADM

## 2021-04-03 RX ADMIN — SODIUM CHLORIDE 75 ML/HR: 9 INJECTION, SOLUTION INTRAVENOUS at 07:26

## 2021-04-03 RX ADMIN — ASPIRIN 81 MG: 81 TABLET, CHEWABLE ORAL at 08:37

## 2021-04-03 RX ADMIN — FAMOTIDINE 20 MG: 20 TABLET, FILM COATED ORAL at 08:37

## 2021-04-03 RX ADMIN — LORAZEPAM 0.5 MG: 2 INJECTION INTRAMUSCULAR; INTRAVENOUS at 08:48

## 2021-04-03 NOTE — DISCHARGE INSTRUCTIONS
Discharge Instructions       PATIENT ID: Mary Schofield  MRN: [id number]   YOB: 1955    DATE OF ADMISSION: 4/2/2021  9:39 AM    DATE OF DISCHARGE: 4/3/2021    PRIMARY CARE PROVIDER: Olivia Mahajan MD     ATTENDING PHYSICIAN: Katelyn Dorsey MD  DISCHARGING PROVIDER: Tamara Chang MD    To contact this individual call [phone] and ask the  to page. If unavailable ask to be transferred the Adult Hospitalist Department. DISCHARGE DIAGNOSES TIA     CONSULTATIONS: IP CONSULT TO HOSPITALIST  IP CONSULT TO NEUROLOGY    PROCEDURES/SURGERIES: * No surgery found *    PENDING TEST RESULTS:   At the time of discharge the following test results are still pending:     FOLLOW UP APPOINTMENTS:   Follow-up Information     Follow up With Specialties Details Why Contact Info    Olivia Mahajan MD Internal Medicine In 2 weeks  [address] [place] [address] [phone]             ADDITIONAL CARE RECOMMENDATIONS:     DIET: Regular Diet and Cardiac Diet    ACTIVITY: Activity as tolerated    WOUND CARE:     EQUIPMENT needed:       Radiology      DISCHARGE MEDICATIONS:   See Medication Reconciliation Form    · It is important that you take the medication exactly as they are prescribed. · Keep your medication in the bottles provided by the pharmacist and keep a list of the medication names, dosages, and times to be taken in your wallet. · Do not take other medications without consulting your doctor. NOTIFY YOUR PHYSICIAN FOR ANY OF THE FOLLOWING:   Fever over 101 degrees for 24 hours. Chest pain, shortness of breath, fever, chills, nausea, vomiting, diarrhea, change in mentation, falling, weakness, bleeding. Severe pain or pain not relieved by medications. Or, any other signs or symptoms that you may have questions about.       DISPOSITION:  x  Home With:   OT  PT  HH  RN       SNF/Inpatient Rehab/LTAC    Independent/assisted living    Hospice    Other:     CDMP Checked:   Yes x     PROBLEM LIST Updated:  Yes x       Signed:   Soha Bermudez MD  4/3/2021  11:05 AM

## 2021-04-03 NOTE — PROGRESS NOTES
Transition of Care Plan   RUR- n/a   DISPOSITION: TBD/ Therapy has not indicated at this point a need for [facility] services or additional follow up   F/U with PCP/Specialist     Transport: family          Observation notice provided in writing to patient and/or caregiver as well as verbal explanation of the policy. Patients who are in outpatient status also receive the Observation notice. Reason for Admission:  Right/ weaknees                     RUR Score:          n/a           Plan for utilizing home health:   TBD/ CM will need to follow       PCP: First and Last name:  Nancy Chavez MD     Name of Practice:    Are you a current patient: Yes/No:    Approximate date of last visit:    Can you participate in a virtual visit with your PCP:                     Current Advanced Directive/Advance Care Plan: Full Code      Healthcare Decision Maker:   Click here to complete [address] including selection of the [address] Relationship (ie \"Primary\") wife/ Johanna Clinton [phone]                             Transition of Care Plan: This CM discussed with patient initial assessment. CM to follow for needs. Patient indicated that he is independent with ADL Care needs. Patient stated that he works full-time and was alert and oriented during this discussion. Wife to transport patient at dc. CM to follow for needs. Therapy has not indicated any needs for follow- up. Care Management Interventions  PCP Verified by CM:  Yes  Palliative Care Criteria Met (RRAT>21 & CHF Dx)?: No  Mode of Transport at Discharge: (family to transport at [facility])  Transition of Care Consult (CM Consult): (TBD/ CM will need to follow)  Health Maintenance Reviewed: Yes  Physical Therapy Consult: Yes  Occupational Therapy Consult: Yes  Speech Therapy Consult: Yes  Current Support Network: Lives with Spouse, Own Home  Confirm Follow Up Transport: (TBD/ CM will need to follow)  Discharge Location  Discharge Placement: (TBD/ CM will need to follow)     Alondra Steinberg  10:42 AM

## 2021-04-03 NOTE — PROGRESS NOTES
Pt sent to MRI via w/c with transport, MRI tech then called floor and said pt was unable to complete MRI due to claustrophobia. NASRIN Collins called and made aware. IV ativan ordered. When I called MRI to let them know RN would be bringing IV ativan to pt, Tech said that pt was declining MRI at this time and wanted to return to room. NASRIN prasad . Will discuss pt's concerns when he returns to floor.

## 2021-04-03 NOTE — PROGRESS NOTES
I have reviewed discharge instructions with the patient and spouse. The patient and spouse verbalized understanding. Bedside RN performed patient education and medication education. Discharge concerns initiated and discussed with patient, including clarification on \"who\" assists the patient at their home and instructions for when the home going patient should call their provider after discharge. Opportunity for questions and clarification was provided. Patient receptive to education: YES  Patient stated: Verbalized Understanding  Barriers to Education: None  Diagnosis Education given:  YES    Length of stay: 2 days  Expected Day of Discharge: 4/3/2021  Ask if they have \"Help at Home\" & add to white board?   YES    Education Day #: 2    Medication Education Given:  YES  M in the box Medication name: ASA    Pt aware of HCAHPS survey: YES      Stroke Education documented in Patient Education: YES  Core Measures Documented in [facility]:  Risk Factors: YES  Warning signs of stroke: YES  When to Activate 911: YES  Medication Education for Risk Factors: YES  Smoking cessation if applicable: YES  Written Education Given:  YES    Discharge NIH Completed: YES  Score: 0

## 2021-04-03 NOTE — PROGRESS NOTES
[facility] Adult  Hospitalist Group                                                                                          Hospitalist Progress Note  Bassam Rucker MD  Answering service: [phone] from in house phone        Date of Service:  4/3/2021  NAME:  Vladimir Manuel  :  1955  MRN:  [id number]      Admission Summary:   Vladimir Manuel is a 63 y.o. male who presents with right-sided weakness    Interval history / Subjective:     Weakness persists awaiting MRI     Assessment & Plan:     TIA POA   - MRI brain, neurovascular checks, CT and CTA negative for acute pathology,   - neurology consult, aspirin, statin, PT OT speech consult, supportive care,   - ECHO      Insomnia: Add Ambien as needed nightly.     Code status: Full  DVT prophylaxis: SCD    Care Plan discussed with: Patient/Family and Nurse  Anticipated Disposition: Home w/Family  Anticipated Discharge: Less than 24 hours     Hospital Problems  Date Reviewed: 2015          Codes Class Noted POA    TIA (transient ischemic attack) ICD-10-CM: G45.9  ICD-9-CM: 435.9  2021 Unknown                Review of Systems:   A comprehensive review of systems was negative. Vital Signs:    Last 24hrs VS reviewed since prior progress note. Most recent are:  Visit Vitals  /64 (BP 1 Location: Right arm, BP Patient Position: At rest)   Pulse 62   Temp 98 °F (36.7 °C)   Resp 16   SpO2 96%       No intake or output data in the 24 hours ending 21 9821     Physical Examination:     I had a face to face encounter with this patient and independently examined them on 4/3/2021 as outlined below:          Constitutional:  No acute distress, cooperative, pleasant    ENT:  Oral mucosa moist, oropharynx benign. Resp:  CTA bilaterally. No wheezing/rhonchi/rales. No accessory muscle use   CV:  Regular rhythm, normal rate, no murmurs, gallops, rubs    GI:  Soft, non distended, non tender.  normoactive bowel sounds, no hepatosplenomegaly     Musculoskeletal:  No edema, warm, 2+ pulses throughout    Neurologic:  Moves all extremities. AAOx3, CN II-XII reviewed     Psych:  Good insight, Not anxious nor agitated. Data Review:    I personally reviewed  Image and LABS    Cta Code Neuro Head And Neck W Cont    Result Date: 4/2/2021  1. No significant perfusion abnormality. 2. Negative CT angiography of the neck and head. Ct Code Neuro Head Wo Contrast    Result Date: 4/2/2021  No evidence of acute process. Ct Code Neuro Perf W Cbf    Result Date: 4/2/2021  1. No significant perfusion abnormality. 2. Negative CT angiography of the neck and head. Labs:     Recent Labs     04/03/21  0008 04/02/21  1010   WBC 4.6 4.4   HGB 16.3 17.8*   HCT 47.7 52.6*   * 135*     Recent Labs     04/02/21  1010      K 3.9      CO2 31   BUN 20   CREA 0.97   *   CA 9.1     Recent Labs     04/02/21  1010   ALT 38   *   TBILI 2.4*   TP 7.7   ALB 4.1   GLOB 3.6     Recent Labs     04/02/21  1010   INR 1.0   PTP 10.2   APTT 24.3      No results for input(s): FE, TIBC, PSAT, FERR in the last 72 hours. No results found for: FOL, RBCF   No results for input(s): PH, PCO2, PO2 in the last 72 hours.   Recent Labs     04/03/21  0008 04/02/21  1641 04/02/21  1010   TROIQ <0.05 <0.05 <0.05     Lab Results   Component Value Date/Time    Cholesterol, total 154 04/03/2021 12:08 AM    HDL Cholesterol 44 04/03/2021 12:08 AM    LDL, calculated 62.8 04/03/2021 12:08 AM    Triglyceride 236 (H) 04/03/2021 12:08 AM    CHOL/HDL Ratio 3.5 04/03/2021 12:08 AM     Lab Results   Component Value Date/Time    Glucose (POC) 115 (H) 04/03/2021 07:49 AM    Glucose (POC) 139 (H) 04/02/2021 10:29 PM    Glucose (POC) 99 04/02/2021 07:37 PM    Glucose (POC) 145 (H) 04/02/2021 09:30 AM     Lab Results   Component Value Date/Time    Color Yellow 04/02/2015 03:25 PM    Appearance Clear 04/02/2015 03:25 PM    pH (UA) 6.5 04/02/2015 03:25 PM    Ketone Trace (A) 04/02/2015 03:25 PM    Bilirubin Negative 04/02/2015 03:25 PM    Nitrites Negative 04/02/2015 03:25 PM    Leukocyte Esterase Negative 04/02/2015 03:25 PM         Medications Reviewed:     Current Facility-Administered Medications   Medication Dose Route Frequency    sertraline (ZOLOFT) tablet 50 mg  50 mg Oral DAILY    acetaminophen (TYLENOL) tablet 650 mg  650 mg Oral Q4H PRN    Or    acetaminophen (TYLENOL) solution 650 mg  650 mg Per NG tube Q4H PRN    Or    acetaminophen (TYLENOL) suppository 650 mg  650 mg Rectal Q4H PRN    0.9% sodium chloride infusion  75 mL/hr IntraVENous CONTINUOUS    ondansetron (ZOFRAN) injection 4 mg  4 mg IntraVENous Q6H PRN    aspirin chewable tablet 81 mg  81 mg Oral DAILY    atorvastatin (LIPITOR) tablet 80 mg  80 mg Oral QHS    famotidine (PEPCID) tablet 20 mg  20 mg Oral Q12H    zolpidem (AMBIEN) tablet 2.5 mg  2.5 mg Oral QHS PRN     ______________________________________________________________________  EXPECTED LENGTH OF STAY: - - -  ACTUAL LENGTH OF STAY:          0                 Compa Dennison MD

## 2021-04-03 NOTE — PROGRESS NOTES
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 Please feel free to contact my office if you have any questions or concerns. Thank you for your assistance in this matter.       Sincerely,  Damaso Muniz MD

## 2021-04-03 NOTE — PROGRESS NOTES
Problem: Patient Education: Go to Patient Education Activity  Goal: Patient/Family Education  Outcome: Resolved/Met     Problem: TIA/CVA Stroke: 0-24 hours  Goal: Off Pathway (Use only if patient is Off Pathway)  Outcome: Resolved/Met  Goal: Activity/Safety  Outcome: Resolved/Met  Goal: Consults, if ordered  Outcome: Resolved/Met  Goal: Diagnostic Test/Procedures  Outcome: Resolved/Met  Goal: Nutrition/Diet  Outcome: Resolved/Met  Goal: Discharge Planning  Outcome: Resolved/Met  Goal: Medications  Outcome: Resolved/Met  Goal: Respiratory  Outcome: Resolved/Met  Goal: Treatments/Interventions/Procedures  Outcome: Resolved/Met  Goal: Minimize risk of bleeding post-thrombolytic infusion  Outcome: Resolved/Met  Goal: Monitor for complications post-thrombolytic infusion  Outcome: Resolved/Met  Goal: Psychosocial  Outcome: Resolved/Met  Goal: *Hemodynamically stable  Outcome: Resolved/Met  Goal: *Neurologically stable  Description: Absence of additional neurological deficits    Outcome: Resolved/Met  Goal: *Verbalizes anxiety and depression are reduced or absent  Outcome: Resolved/Met  Goal: *Absence of Signs of Aspiration on Current Diet  Outcome: Resolved/Met  Goal: *Absence of deep venous thrombosis signs and symptoms(Stroke Metric)  Outcome: Resolved/Met  Goal: *Ability to perform ADLs and demonstrates progressive mobility and function  Outcome: Resolved/Met  Goal: *Stroke education started(Stroke Metric)  Outcome: Resolved/Met  Goal: *Dysphagia screen performed(Stroke Metric)  Outcome: Resolved/Met  Goal: *Rehab consulted(Stroke Metric)  Outcome: Resolved/Met     Problem: TIA/CVA Stroke: Day 2 Until Discharge  Goal: Off Pathway (Use only if patient is Off Pathway)  Outcome: Resolved/Met  Goal: Activity/Safety  Outcome: Resolved/Met  Goal: Diagnostic Test/Procedures  Outcome: Resolved/Met  Goal: Nutrition/Diet  Outcome: Resolved/Met  Goal: Discharge Planning  Outcome: Resolved/Met  Goal: Medications  Outcome: Resolved/Met  Goal: Respiratory  Outcome: Resolved/Met  Goal: Treatments/Interventions/Procedures  Outcome: Resolved/Met  Goal: Psychosocial  Outcome: Resolved/Met  Goal: *Verbalizes anxiety and depression are reduced or absent  Outcome: Resolved/Met  Goal: *Absence of aspiration  Outcome: Resolved/Met  Goal: *Absence of deep venous thrombosis signs and symptoms(Stroke Metric)  Outcome: Resolved/Met  Goal: *Optimal pain control at patient's stated goal  Outcome: Resolved/Met  Goal: *Tolerating diet  Outcome: Resolved/Met  Goal: *Ability to perform ADLs and demonstrates progressive mobility and function  Outcome: Resolved/Met  Goal: *Stroke education continued(Stroke Metric)  Outcome: Resolved/Met     Problem: Ischemic Stroke: Discharge Outcomes  Goal: *Verbalizes anxiety and depression are reduced or absent  Outcome: Resolved/Met  Goal: *Verbalize understanding of risk factor modification(Stroke Metric)  Outcome: Resolved/Met  Goal: *Hemodynamically stable  Outcome: Resolved/Met  Goal: *Absence of aspiration pneumonia  Outcome: Resolved/Met  Goal: *Aware of needed dietary changes  Outcome: Resolved/Met  Goal: *Verbalize understanding of prescribed medications including anti-coagulants, anti-lipid, and/or anti-platelets(Stroke Metric)  Outcome: Resolved/Met  Goal: *Tolerating diet  Outcome: Resolved/Met  Goal: *Aware of follow-up diagnostics related to anticoagulants  Outcome: Resolved/Met  Goal: *Ability to perform ADLs and demonstrates progressive mobility and function  Outcome: Resolved/Met  Goal: *Absence of DVT(Stroke Metric)  Outcome: Resolved/Met  Goal: *Absence of aspiration  Outcome: Resolved/Met  Goal: *Optimal pain control at patient's stated goal  Outcome: Resolved/Met  Goal: *Home safety concerns addressed  Outcome: Resolved/Met  Goal: *Describes available resources and support systems  Outcome: Resolved/Met  Goal: *Verbalizes understanding of activation of EMS(911) for stroke symptoms(Stroke Metric)  Outcome: Resolved/Met  Goal: *Understands and describes signs and symptoms to report to providers(Stroke Metric)  Outcome: Resolved/Met  Goal: *Neurolgocially stable (absence of additional neurological deficits)  Outcome: Resolved/Met  Goal: *Verbalizes importance of follow-up with primary care physician(Stroke Metric)  Outcome: Resolved/Met  Goal: *Smoking cessation discussed,if applicable(Stroke Metric)  Outcome: Resolved/Met  Goal: *Depression screening completed(Stroke Metric)  Outcome: Resolved/Met     Problem: Dysphagia (Adult)  Goal: *Speech Goal: (INSERT TEXT)  Outcome: Resolved/Met     Problem: Patient Education: Go to Patient Education Activity  Goal: Patient/Family Education  Outcome: Resolved/Met     Problem: Falls - Risk of  Goal: *Absence of Falls  Description: Document Krupa Fall Risk and appropriate interventions in the flowsheet.   Outcome: Resolved/Met     Problem: Patient Education: Go to Patient Education Activity  Goal: Patient/Family Education  Outcome: Resolved/Met

## 2021-04-03 NOTE — PROGRESS NOTES
Problem: Patient Education: Go to Patient Education Activity  Goal: Patient/Family Education  Outcome: Progressing Towards Goal     Problem: TIA/CVA Stroke: 0-24 hours  Goal: Off Pathway (Use only if patient is Off Pathway)  Outcome: Progressing Towards Goal  Goal: Activity/Safety  Outcome: Progressing Towards Goal  Goal: Consults, if ordered  Outcome: Progressing Towards Goal  Goal: Diagnostic Test/Procedures  Outcome: Progressing Towards Goal  Goal: Nutrition/Diet  Outcome: Progressing Towards Goal  Goal: Discharge Planning  Outcome: Progressing Towards Goal  Goal: Medications  Outcome: Progressing Towards Goal  Goal: Respiratory  Outcome: Progressing Towards Goal  Goal: Treatments/Interventions/Procedures  Outcome: Progressing Towards Goal  Goal: Minimize risk of bleeding post-thrombolytic infusion  Outcome: Progressing Towards Goal  Goal: Monitor for complications post-thrombolytic infusion  Outcome: Progressing Towards Goal  Goal: Psychosocial  Outcome: Progressing Towards Goal  Goal: *Hemodynamically stable  Outcome: Progressing Towards Goal  Goal: *Neurologically stable  Description: Absence of additional neurological deficits    Outcome: Progressing Towards Goal  Goal: *Verbalizes anxiety and depression are reduced or absent  Outcome: Progressing Towards Goal  Goal: *Absence of Signs of Aspiration on Current Diet  Outcome: Progressing Towards Goal  Goal: *Absence of deep venous thrombosis signs and symptoms(Stroke Metric)  Outcome: Progressing Towards Goal  Goal: *Ability to perform ADLs and demonstrates progressive mobility and function  Outcome: Progressing Towards Goal  Goal: *Stroke education started(Stroke Metric)  Outcome: Progressing Towards Goal  Goal: *Dysphagia screen performed(Stroke Metric)  Outcome: Progressing Towards Goal  Goal: *Rehab consulted(Stroke Metric)  Outcome: Progressing Towards Goal     Problem: TIA/CVA Stroke: Day 2 Until Discharge  Goal: Off Pathway (Use only if patient is Off Pathway)  Outcome: Progressing Towards Goal  Goal: Activity/Safety  Outcome: Progressing Towards Goal  Goal: Diagnostic Test/Procedures  Outcome: Progressing Towards Goal  Goal: Nutrition/Diet  Outcome: Progressing Towards Goal  Goal: Discharge Planning  Outcome: Progressing Towards Goal  Goal: Medications  Outcome: Progressing Towards Goal  Goal: Respiratory  Outcome: Progressing Towards Goal  Goal: Treatments/Interventions/Procedures  Outcome: Progressing Towards Goal  Goal: Psychosocial  Outcome: Progressing Towards Goal  Goal: *Verbalizes anxiety and depression are reduced or absent  Outcome: Progressing Towards Goal  Goal: *Absence of aspiration  Outcome: Progressing Towards Goal  Goal: *Absence of deep venous thrombosis signs and symptoms(Stroke Metric)  Outcome: Progressing Towards Goal  Goal: *Optimal pain control at patient's stated goal  Outcome: Progressing Towards Goal  Goal: *Tolerating diet  Outcome: Progressing Towards Goal  Goal: *Ability to perform ADLs and demonstrates progressive mobility and function  Outcome: Progressing Towards Goal  Goal: *Stroke education continued(Stroke Metric)  Outcome: Progressing Towards Goal     Problem: Ischemic Stroke: Discharge Outcomes  Goal: *Verbalizes anxiety and depression are reduced or absent  Outcome: Progressing Towards Goal  Goal: *Verbalize understanding of risk factor modification(Stroke Metric)  Outcome: Progressing Towards Goal  Goal: *Hemodynamically stable  Outcome: Progressing Towards Goal  Goal: *Absence of aspiration pneumonia  Outcome: Progressing Towards Goal  Goal: *Aware of needed dietary changes  Outcome: Progressing Towards Goal  Goal: *Verbalize understanding of prescribed medications including anti-coagulants, anti-lipid, and/or anti-platelets(Stroke Metric)  Outcome: Progressing Towards Goal  Goal: *Tolerating diet  Outcome: Progressing Towards Goal  Goal: *Aware of follow-up diagnostics related to anticoagulants  Outcome: Progressing Towards Goal  Goal: *Ability to perform ADLs and demonstrates progressive mobility and function  Outcome: Progressing Towards Goal  Goal: *Absence of DVT(Stroke Metric)  Outcome: Progressing Towards Goal  Goal: *Absence of aspiration  Outcome: Progressing Towards Goal  Goal: *Optimal pain control at patient's stated goal  Outcome: Progressing Towards Goal  Goal: *Home safety concerns addressed  Outcome: Progressing Towards Goal  Goal: *Describes available resources and support systems  Outcome: Progressing Towards Goal  Goal: *Verbalizes understanding of activation of EMS(911) for stroke symptoms(Stroke Metric)  Outcome: Progressing Towards Goal  Goal: *Understands and describes signs and symptoms to report to providers(Stroke Metric)  Outcome: Progressing Towards Goal  Goal: *Neurolgocially stable (absence of additional neurological deficits)  Outcome: Progressing Towards Goal  Goal: *Verbalizes importance of follow-up with primary care physician(Stroke Metric)  Outcome: Progressing Towards Goal  Goal: *Smoking cessation discussed,if applicable(Stroke Metric)  Outcome: Progressing Towards Goal  Goal: *Depression screening completed(Stroke Metric)  Outcome: Progressing Towards Goal     Problem: Dysphagia (Adult)  Goal: *Speech Goal: (INSERT TEXT)  Outcome: Progressing Towards Goal     Problem: Patient Education: Go to Patient Education Activity  Goal: Patient/Family Education  Outcome: Progressing Towards Goal

## 2021-10-05 ENCOUNTER — OFFICE VISIT (OUTPATIENT)
Dept: NEUROLOGY | Age: 66
End: 2021-10-05
Payer: COMMERCIAL

## 2021-10-05 VITALS
SYSTOLIC BLOOD PRESSURE: 140 MMHG | DIASTOLIC BLOOD PRESSURE: 78 MMHG | WEIGHT: 228 LBS | OXYGEN SATURATION: 98 % | HEART RATE: 88 BPM | BODY MASS INDEX: 30.22 KG/M2 | HEIGHT: 73 IN

## 2021-10-05 DIAGNOSIS — R41.3 MEMORY LOSS: Primary | ICD-10-CM

## 2021-10-05 DIAGNOSIS — Z86.73 HX OF TIA (TRANSIENT ISCHEMIC ATTACK) AND STROKE: ICD-10-CM

## 2021-10-05 DIAGNOSIS — G47.00 INSOMNIA, UNSPECIFIED TYPE: ICD-10-CM

## 2021-10-05 PROCEDURE — 99214 OFFICE O/P EST MOD 30 MIN: CPT | Performed by: PSYCHIATRY & NEUROLOGY

## 2021-10-05 RX ORDER — MAGNESIUM 200 MG
TABLET ORAL
COMMUNITY

## 2021-10-05 RX ORDER — BISMUTH SUBSALICYLATE 262 MG
2 TABLET,CHEWABLE ORAL
COMMUNITY

## 2021-10-05 RX ORDER — ASPIRIN 81 MG/1
1 TABLET ORAL
COMMUNITY
Start: 2021-04-30

## 2021-10-05 NOTE — PROGRESS NOTES
Chief Complaint   Patient presents with   [facility] Follow Up     follow up after \"TIA in 04/2021\"     Visit Vitals  BP (!) 140/78 (BP 1 Location: Left upper arm)   Pulse 88   Ht 6' 1\" (1.854 m)   Wt 228 lb (103.4 kg)   SpO2 98%   BMI 30.08 kg/m²

## 2021-10-05 NOTE — PROGRESS NOTES
Chief Complaint   Patient presents with   [facility] Follow Up     follow up after \"TIA in 04/2021\"       HPI    Mr. Hernán Alvarez is a 79-year-old gentleman I evaluated in the hospital in April of this year when he presented with transient right-sided weakness and dizziness. We completed a work-up consisting of a CTA which was normal.  MRI with chronic ischemic changes but no acute process. His symptoms lasted for hours. He was discharged on aspirin and has not had any recurrence. What continues to bother him now however is that his memory is getting worse. He has difficulty with names and recent events. ADLs intact. He has chronic insomnia such that he wakes up in the middle of the night has difficulty falling asleep. He already takes high-dose melatonin. Background:  Mr. Hernán Alvarez is a 79-year-old gentleman with a history of anxiety and testosterone deficiency here because this morning he noticed when he was awake in the middle night the right arm and leg did not want to work very well. It was kind of clumsy. He went back to sleep and when he woke up again he was still feeling this and also had some dizziness which is unusual.  He got up and noticed his right side was heavy. He had no speech change. No difficulty with vision. He came to the hospital.  Since being here the symptoms are better. Not resolved. He says 2 weeks ago he had a very similar event but no dizziness and less intense and resolved after short period of time but he cannot tell me how long. He does not take aspirin. CT CTA unrevealing at the moment. Non-smoker, 1 beer nightly. 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 Review of Systems   Neurological: Positive for dizziness. Psychiatric/Behavioral: Positive for memory loss. The patient has insomnia. All other systems reviewed and are negative.       Past Medical History:   Diagnosis Date    Anxiety     ED (erectile dysfunction)     Raul syndrome     Gout     Laceration     Lactose intolerance     Plantar fasciitis     Prostatitis     Trauma     rt eye injury      Family History   Problem Relation Age of Onset    Heart Disease Father         Popliteal aneurysm.  Stroke Father         TIAs     Cancer Father         prostate     Social History     Socioeconomic History    Marital status:      Spouse name: Not on file    Number of children: Not on file    Years of education: Not on file    Highest education level: Not on file   Occupational History    Not on file   Tobacco Use    Smoking status: Never Smoker    Smokeless tobacco: Never Used   Substance and Sexual Activity    Alcohol use: Yes     Comment: 1 beer /month    Drug use: No    Sexual activity: Not on file   Other Topics Concern    Not on file   Social History Narrative    Not on file     Social Determinants of Health     Financial Resource Strain:     Difficulty of Paying Living Expenses:    Food Insecurity:     Worried About Running Out of Food in the Last Year:     [address] in the Last Year:    Transportation Needs:     Lack of Transportation (Medical):  Lack of Transportation (Non-Medical):    Physical Activity:     Days of Exercise per Week:     Minutes of Exercise per Session:    Stress:     Feeling of Stress :    Social Connections:     Frequency of Communication with Friends and Family:     Frequency of Social Gatherings with Friends and Family:     Attends Muslim Services:     Active Member of Clubs or Organizations:     Attends Club or Organization Meetings:     Marital Status:    Intimate Partner Violence:     Fear of Current or Ex-Partner:     Emotionally Abused:     Physically Abused:     Sexually Abused:       Allergies   Allergen Reactions    Celexa [Citalopram] Other (comments)     tinnitus    Cialis [Tadalafil] Other (comments)     Dizziness     Effexor [Venlafaxine] Other (comments)     Suppressed appetite     Levitra [Vardenafil] Other (comments)     Malaise     Prozac [Fluoxetine] Other (comments)     drowsiness    Sulfa (Sulfonamide Antibiotics) Other (comments)     Abdominal pain    Viagra [Sildenafil] Other (comments)     Tinnitus     Zoloft [Sertraline] Other (comments)     Confusion          Current Outpatient Medications   Medication Sig    magnesium 200 mg tab Take  by mouth.  aspirin delayed-release 81 mg tablet Take 1 Tablet by mouth.  multivitamin (ONE A DAY) tablet Take 2 Tablets by mouth.  atorvastatin (LIPITOR) 10 mg tablet TAKE 1 TABLET BY MOUTH DAILY    OTHER Magnesium Glycinate 400 mg qhs; Glucosamine Chondroitin , Zyrtec D . Aveed injections for hypogonadism    sildenafil, antihypertensive, (REVATIO) 20 mg tablet Take 20 mg by mouth daily as needed.  melatonin 3 mg tablet Take 3 mg by mouth nightly.  therapeutic multivitamin (THERAGRAN) tablet Take 1 Tab by mouth every other day. No current facility-administered medications for this visit. Neurologic Exam     Mental Status   WD/WN adult in NAD, normal grooming  VSS  A&O x 3    PERRL, nonicteric  Face is masked  Speech is clear  No limb ataxia. No abnl movements. Moving all extemities spontaneously and symmetric  Normal gait           Visit Vitals  BP (!) 140/78 (BP 1 Location: Left upper arm)   Pulse 88   Ht 6' 1\" (1.854 m)   Wt 228 lb (103.4 kg)   SpO2 98%   BMI 30.08 kg/m²       Assessment and Plan   Diagnoses and all orders for this visit:    1. Memory loss  -     REFERRAL TO PSYCHOLOGY    2. Insomnia, unspecified type  -     REFERRAL TO PSYCHOLOGY  -     REFERRAL TO PULMONARY DISEASE    3. Hx of TIA (transient ischemic attack) and stroke      60-year-old gentleman who presented with TIA-like symptoms in April now back to baseline. I wonder if he did have an acute small stroke we could not see on imaging given the duration of his symptoms.   Regardless, he needs to stay on low-dose aspirin lifelong which was initiated after this event. Stay on his cholesterol medication. The memory loss is concerning. It sounds more short-term. It could be multifactorial in the setting of poor quality sleep. Imaging does show chronic ischemic changes long-term. I think he should have a sleep evaluation done. Formal neuropsychological testing to establish a baseline. He is a family history for dementia. Change melatonin to a vegan synthetic type and avoid the natural type. I would like to see him after he gets his testing done. 30 minutes of time was spent in total today reviewing the medical record to include the hospitalization records, personal review of the neuroimaging, face-to-face time discussing symptoms, and completion of documentation today. I reviewed and decided to continue the current medications. This clinical note was dictated with an electronic dictation software that can make unintentional errors. If there are any questions, please contact me directly for clarification.       [address] [facility], [facility] [address]  Diplomate JOSE

## 2021-10-25 ENCOUNTER — TELEPHONE (OUTPATIENT)
Dept: NEUROLOGY | Age: 66
End: 2021-10-25

## 2022-03-16 ENCOUNTER — HOSPITAL ENCOUNTER (OUTPATIENT)
Dept: INFUSION THERAPY | Age: 67
Discharge: HOME | End: 2022-03-16
Payer: COMMERCIAL

## 2022-03-16 VITALS
TEMPERATURE: 97.6 F | RESPIRATION RATE: 20 BRPM | SYSTOLIC BLOOD PRESSURE: 150 MMHG | HEART RATE: 68 BPM | DIASTOLIC BLOOD PRESSURE: 89 MMHG | OXYGEN SATURATION: 97 %

## 2022-03-16 LAB
HCT VFR BLD AUTO: 50.9 % (ref 36.6–50.3)
HGB BLD-MCNC: 17.4 G/DL (ref 12.1–17)

## 2022-03-16 PROCEDURE — 85018 HEMOGLOBIN: CPT

## 2022-03-16 PROCEDURE — 36415 COLL VENOUS BLD VENIPUNCTURE: CPT

## 2022-03-16 RX ORDER — SODIUM CHLORIDE 9 MG/ML
25 INJECTION, SOLUTION INTRAVENOUS CONTINUOUS
Status: DISCONTINUED | OUTPATIENT
Start: 2022-03-16 | End: 2022-03-17 | Stop reason: HOSPADM

## 2022-03-16 NOTE — PROGRESS NOTES
[facility] BREMO VISIT NOTE      1300 Patient arrives for Therapeutic Phlebotomy/lab without acute problems. Please see connect [facility] for complete assessment and education provided. Peripheral stick to left ac for lab draw; labs sent for processing. Vitals Signs:     Patient Vitals for the past 12 hrs:   Temp Pulse Resp BP SpO2   03/16/22 1305 97.6 °F (36.4 °C) 68 20 (!) 150/89 97 %        Labs:    Recent Results (from the past 12 hour(s))   HGB & HCT    Collection Time: 03/16/22  1:11 PM   Result Value Ref Range    HGB 17.4 (H) 12.1 - 17.0 g/dL    HCT 50.9 (H) 36.6 - 50.3 %         Patient's labs not within parameters for treatment. Patient was given lab results to take to referring provider. 1355 Vital signs stable throughout and prior to discharge, Patient tolerated treatment well and discharged without incident. Patient/parent is aware of no further [facility] appointments and to follow up with referring provider for any questions or concerns. Peggy Del Toro

## 2022-05-11 ENCOUNTER — OFFICE VISIT (OUTPATIENT)
Dept: NEUROLOGY | Age: 67
End: 2022-05-11
Payer: COMMERCIAL

## 2022-05-11 DIAGNOSIS — G31.84 MILD COGNITIVE IMPAIRMENT: Primary | ICD-10-CM

## 2022-05-11 DIAGNOSIS — R41.3 SHORT-TERM MEMORY LOSS: ICD-10-CM

## 2022-05-11 DIAGNOSIS — Z86.73 HISTORY OF TIA (TRANSIENT ISCHEMIC ATTACK): ICD-10-CM

## 2022-05-11 DIAGNOSIS — R41.89 COGNITIVE DECLINE: ICD-10-CM

## 2022-05-11 DIAGNOSIS — R47.89 WORD FINDING DIFFICULTY: ICD-10-CM

## 2022-05-11 DIAGNOSIS — G47.30 MILD SLEEP APNEA: ICD-10-CM

## 2022-05-11 DIAGNOSIS — G25.81 RESTLESS LEG: ICD-10-CM

## 2022-05-11 DIAGNOSIS — F41.9 CHRONIC ANXIETY: ICD-10-CM

## 2022-05-11 PROCEDURE — 90791 PSYCH DIAGNOSTIC EVALUATION: CPT | Performed by: CLINICAL NEUROPSYCHOLOGIST

## 2022-05-11 NOTE — PROGRESS NOTES
[address] [facility],5Th Floor  [address]   [address] [place] [address] [place] [address] [address]   [phone] Office   [phone] Fax      Neuropsychology    Initial Diagnostic Interview Note      Referral:  Helder Villatoro MD,  Ebony Juan is a 61 y.o. right handed   male who was unaccompanied to the initial clinical interview on 22 . Please refer to his medical records for details pertaining to his history. At the start of the appointment, I reviewed the patient's [facility] Epic Chart (including Media scanned in from previous providers) for the active Problem List, all pertinent Past Medical Hx, medications, recent radiologic and laboratory findings. In addition, I reviewed pt's documented Immunization Record and Encounter History. Completed a Master's Degree without history of previously diagnosed LD and/or receipt of special education services. He is retired this past January. He had symptoms in 2021 which was suggestive of TIA. Went to hospital.   He is seen in inpatient and outpatient by neurology and referred here for examination. There is a question of acute small stroke which would not seen on imaging. He was started on low-dose aspirin. He reports memory loss short-term. Question multifactorial versus involution of a dementia type process. He has poor quality sleep. There is a family history of dementia. CT CTA unrevealing. He has a hard time coming up with words. Forgets the content of conversations. Misplaces things. Starts tasks and does not complete. Had a brain fog for a bit- seems to be clearing up, but cognitive issues persist.   Problems progressive. Remains independent for driving, medications, finances, day-to-day chores, etc. His father has  from dementia.  in his 80s. Sleep is very poor. Has chronic anxiety.   He will wake up multople times every night, 2:30 or 3 in the morning and this has been going on for years, and may be able to sleep only an hour after that. He had a very hard time when trixie was in office. He just couldn't believe all the lies and what was going on and the insurrection and all of it was very worrisome to him. Worried about the state of things and the country never recovering from what trixie did. 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 He has RLS. Mild apnea was found and he couldn't tolerate the CPAP machine. No previous neuropsych.         EXAM: MRI BRAIN WO CONT     INDICATION: CVA     COMPARISON: CTA 4/20/2021.     CONTRAST: None.     TECHNIQUE:    Multiplanar multisequence acquisition without contrast of the brain.     FINDINGS:  The ventricles are normal in size and position. There is mild white matter  disease likely to chronic small vessel ischemic disease There is no acute  infarct, hemorrhage, extra-axial fluid collection, or mass effect. There is no  cerebellar tonsillar herniation. Expected arterial flow-voids are present.     There is a mucus retention cyst in the right maxillary sinus. The orbital  contents are within normal limits. No significant osseous or scalp lesions are  identified.     IMPRESSION  Mild white matter disease likely related to chronic small vessel ischemic  disease. No acute process.         Neuropsychological Mental Status Exam (NMSE):      Historian: Good  Praxis: No UE apraxia  R/L Orientation: Intact to self and to other  Dress: within normal limits   Weight: within normal limits   Appearance/Hygiene: within normal limits   Gait: within normal limits   Assistive Devices: Glasses  Mood: within normal limits   Affect: within normal limits   Comprehension: within normal limits   Thought Process: within normal limits   Expressive Language: within normal limits   Receptive Language: within normal limits   Motor:  No cognitive or motor perseveration  ETOH: 1 beer a night  Tobacco: Denied  Illicit: Denied  SI/HI: Denied  Psychosis: Denied  Insight: Within normal limits  Judgment: Within normal limits  Other Psych:      Past Medical History:   Diagnosis Date    Anxiety     ED (erectile dysfunction)     Gilbert's syndrome     Gout     Laceration     Lactose intolerance     Plantar fasciitis     Prostatitis     Trauma     rt eye injury        Past Surgical History:   Procedure Laterality Date    ENDOSCOPY, COLON, DIAGNOSTIC  2007    flex sigmoidoscopy , at [facility] HX COLONOSCOPY  04/15/2016    [place] , f/u 8 y , Dr. Antolin Marmolejo HX HEENT      septoplasty; sinus surgery . rt eye sgy for trauma    HX ORTHOPAEDIC  1975    rt knee open meniscectomy    HX UROLOGICAL  04/2016    TURB -T benign tumor       Allergies   Allergen Reactions    Celexa [Citalopram] Other (comments)     tinnitus    Cialis [Tadalafil] Other (comments)     Dizziness     Effexor [Venlafaxine] Other (comments)     Suppressed appetite     Levitra [Vardenafil] Other (comments)     Malaise     Prozac [Fluoxetine] Other (comments)     drowsiness    Sulfa (Sulfonamide Antibiotics) Other (comments)     Abdominal pain    Viagra [Sildenafil] Other (comments)     Tinnitus     Zoloft [Sertraline] Other (comments)     Confusion        Family History   Problem Relation Age of Onset    Heart Disease Father         Popliteal aneurysm.  Stroke Father         TIAs     Cancer Father         prostate       Social History     Tobacco Use    Smoking status: Never Smoker    Smokeless tobacco: Never Used   Substance Use Topics    Alcohol use: Yes     Comment: 1 beer /month    Drug use: No       Current Outpatient Medications   Medication Sig Dispense Refill    atorvastatin (LIPITOR) 10 mg tablet Take 1 Tablet by mouth daily. 90 Tablet 1    DULoxetine (CYMBALTA) 20 mg capsule Take 1 Capsule by mouth daily. (Patient not taking: Reported on 3/16/2022) 30 Capsule 0    magnesium 200 mg tab Take  by mouth.  aspirin delayed-release 81 mg tablet Take 1 Tablet by mouth.  multivitamin (ONE A DAY) tablet Take 2 Tablets by mouth.  OTHER Magnesium Glycinate 400 mg qhs; Glucosamine Chondroitin , Zyrtec D . Aveed injections for hypogonadism      sildenafil, antihypertensive, (REVATIO) 20 mg tablet Take 20 mg by mouth daily as needed. (Patient not taking: Reported on 3/16/2022)      melatonin 3 mg tablet Take 3 mg by mouth nightly.  therapeutic multivitamin (THERAGRAN) tablet Take 1 Tab by mouth every other day. Plan:  Obtain authorization for testing from insurance company. Report to follow once testing, scoring, and interpretation completed. ? Organic based neurocognitive issues versus mood disorder or combination of same. ? Problems organic, functional, or both? This note will not be viewable in [address]

## 2022-06-20 ENCOUNTER — OFFICE VISIT (OUTPATIENT)
Dept: NEUROLOGY | Age: 67
End: 2022-06-20
Payer: COMMERCIAL

## 2022-06-20 DIAGNOSIS — F32.0 MILD MAJOR DEPRESSION (HCC): ICD-10-CM

## 2022-06-20 DIAGNOSIS — G31.84 MILD COGNITIVE IMPAIRMENT: Primary | ICD-10-CM

## 2022-06-20 DIAGNOSIS — Z86.73 HISTORY OF TIA (TRANSIENT ISCHEMIC ATTACK): ICD-10-CM

## 2022-06-20 DIAGNOSIS — F41.9 MODERATE ANXIETY: ICD-10-CM

## 2022-06-20 PROCEDURE — 96138 PSYCL/NRPSYC TECH 1ST: CPT | Performed by: CLINICAL NEUROPSYCHOLOGIST

## 2022-06-20 PROCEDURE — 96137 PSYCL/NRPSYC TST PHY/QHP EA: CPT | Performed by: CLINICAL NEUROPSYCHOLOGIST

## 2022-06-20 PROCEDURE — 96136 PSYCL/NRPSYC TST PHY/QHP 1ST: CPT | Performed by: CLINICAL NEUROPSYCHOLOGIST

## 2022-06-20 PROCEDURE — 96139 PSYCL/NRPSYC TST TECH EA: CPT | Performed by: CLINICAL NEUROPSYCHOLOGIST

## 2022-06-20 PROCEDURE — 96132 NRPSYC TST EVAL PHYS/QHP 1ST: CPT | Performed by: CLINICAL NEUROPSYCHOLOGIST

## 2022-06-20 PROCEDURE — 96133 NRPSYC TST EVAL PHYS/QHP EA: CPT | Performed by: CLINICAL NEUROPSYCHOLOGIST

## 2022-06-20 NOTE — LETTER
7/6/2022    Patient: Rody Hinojosa   YOB: 1955   Date of Visit: 6/20/2022     [address] IV, MD  [address] [address]  [address] [place] [address]  [address] [place]    Dear Fidel Sanchez MD  [facility], ,      Thank you for referring Mr. Cale Toledo to [facility] for evaluation. My notes for this consultation are attached. If you have questions, please do not hesitate to call me. I look forward to following your patient along with you.       Sincerely,    Yazmin Hunt PsyD

## 2022-07-11 ENCOUNTER — OFFICE VISIT (OUTPATIENT)
Dept: NEUROLOGY | Age: 67
End: 2022-07-11
Payer: COMMERCIAL

## 2022-07-11 VITALS
OXYGEN SATURATION: 98 % | WEIGHT: 221 LBS | HEIGHT: 73 IN | HEART RATE: 74 BPM | DIASTOLIC BLOOD PRESSURE: 84 MMHG | BODY MASS INDEX: 29.29 KG/M2 | SYSTOLIC BLOOD PRESSURE: 134 MMHG

## 2022-07-11 DIAGNOSIS — F41.9 MODERATE ANXIETY: ICD-10-CM

## 2022-07-11 DIAGNOSIS — F32.0 MILD MAJOR DEPRESSION (HCC): ICD-10-CM

## 2022-07-11 DIAGNOSIS — R41.840 ATTENTION AND CONCENTRATION DEFICIT: Primary | ICD-10-CM

## 2022-07-11 DIAGNOSIS — Z63.0 MARITAL STRESS: ICD-10-CM

## 2022-07-11 PROCEDURE — 99214 OFFICE O/P EST MOD 30 MIN: CPT | Performed by: PSYCHIATRY & NEUROLOGY

## 2022-07-11 RX ORDER — DIAZEPAM 5 MG/1
TABLET ORAL
COMMUNITY
Start: 2022-05-18

## 2022-07-11 SDOH — SOCIAL STABILITY - SOCIAL INSECURITY: PROBLEMS IN RELATIONSHIP WITH SPOUSE OR PARTNER: Z63.0

## 2022-07-11 NOTE — PROGRESS NOTES
Chief Complaint   Patient presents with    Follow-up     short term memory loss \"memory is a little better. \" Here to discuss results with Dr. Sohail Vital     Visit Vitals  /84 (BP 1 Location: Right upper arm, BP Patient Position: Sitting)   Pulse 74   Ht 6' 1\" (1.854 m)   Wt 221 lb (100.2 kg)   SpO2 98%   BMI 29.16 kg/m²

## 2022-07-11 NOTE — PROGRESS NOTES
Chief Complaint   Patient presents with    Follow-up     short term memory loss \"memory is a little better. \" Here to discuss results with Dr. Brittny Alfaro       HPI    62-year-old gentleman with history of TIA on aspirin presenting to follow-up after being assessed for memory loss from neuropsychology. Results are below. Essentially, he has normal cognitive function but notable levels of anxiety and depression. When I speak with him personally he has a lot of stressors ongoing ranging from financial stressors postretirement to marital stress. He has a long history of anxiety and he has insight into this. He has insomnia. Low testosterone reportedly as well. ADLs intact. NeuroPSY 2022  Impressions & Recommendations: This patient generated a predominantly normal range though limited neuropsychological Evaluation with respect to neurocognitive functioning. In this regard, although he came specifically to this appointment for the purpose of better understanding his day-to-day functional memory loss, the patient refused to complete memory testing. This was despite explanation as to how these memory tests work. He then took 1 different memory test (as an alternative that I suggested), but then refused to complete that test, also. At the same time, a review of his neurocognitive profile reveals valid range of scores which are fully normal.  In this regard, his performances across all other neurocognitive domains assessed are well within or above the normal range. This includes normal range mental status, verbal fluency, confrontation naming, visual attention, auditory attention, verbal comprehension, perceptual reasoning, processing speed, executive functioning, and bilateral motor skills. It is noted that working memory is low relative to processing speed which often signals functional/mood interference.   On the memory tests that we were able to administer to him, his recognition recall is also normal, suggesting functional interference as the basis for his day-to-day memory issues. From an emotional standpoint, there is considerable anxiety as well as to a lesser degree more mild depression. Anxiety is severe to the degree whereby his ability to focus and to attend is markedly compromised. The patient's self-reported level of treatment motivation is very low.                   Thankfully, there is no evidence at present which would suggest the presence of dementia or MCI. Instead, I strongly suspect pseudodementia type symptoms here with marked anxiety and depression as the culprits. In addition to continued medical care, my recommendations include psychiatric treatment and counseling for anxiety and depression. Motivation for treatment is low, though, and he has a tendency to attribute responsibility for setbacks externally. I will have a discussion with him about this at follow-up, because I believe that it is of critical importance for him to work on his mental health and address those issues in order for his day-to-day functioning to improve. I wish him well. I am not currently concerned about competency/capacity, driving, day-to-day supervision, etc.  The patient should be encouraged to remain as mentally, physically, socially active as possible. We have extensive baseline neurocognitive psychologic data on him. Follow-up period. Clinical correlation is concourse, indicated.                 I will discuss these findings with the patient when he follows up with me in the near future.   A follow up Neuropsychological Evaluation is indicated on a prn basis, especially if there are any cognitive and/or emotional changes.       DIAGNOSES:              The Referral Diagnosis of MCI Cognitive Difficulties - IS NOT SUPPORTED                                      Major Depression - Mild                                      Anxiety Disorder - Moderate to Severe                                      Hx of TIA    Background:  Mr. Neelma Condon is a 62-year-old gentleman with a history of anxiety and testosterone deficiency here because this morning he noticed when he was awake in the middle night the right arm and leg did not want to work very well. It was kind of clumsy. He went back to sleep and when he woke up again he was still feeling this and also had some dizziness which is unusual.  He got up and noticed his right side was heavy. He had no speech change. No difficulty with vision. He came to the hospital.  Since being here the symptoms are better. Not resolved. He says 2 weeks ago he had a very similar event but no dizziness and less intense and resolved after short period of time but he cannot tell me how long. He does not take aspirin. CT CTA unrevealing at the moment. Non-smoker, 1 beer nightly. 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 Review of Systems   Neurological: Positive for dizziness. Psychiatric/Behavioral: Positive for memory loss. The patient has insomnia. All other systems reviewed and are negative. Past Medical History:   Diagnosis Date    Anxiety     ED (erectile dysfunction)     Gilbert's syndrome     Gout     Laceration     Lactose intolerance     Plantar fasciitis     Prostatitis     Trauma     rt eye injury      Family History   Problem Relation Age of Onset    Heart Disease Father         Popliteal aneurysm.     Stroke Father         TIAs     Cancer Father         prostate     Social History     Socioeconomic History    Marital status:      Spouse name: Not on file    Number of children: Not on file    Years of education: Not on file    Highest education level: Not on file   Occupational History    Not on file   Tobacco Use    Smoking status: Never Smoker    Smokeless tobacco: Never Used   Vaping Use    Vaping Use: Never used   Substance and Sexual Activity    Alcohol use: Yes     Comment: 1 beer /month    Drug use: No    Sexual activity: Not on file   Other Topics Concern    Not on file   Social History Narrative    Not on file     Social Determinants of Health     Financial Resource Strain:     Difficulty of Paying Living Expenses: Not on file   Food Insecurity:     Worried About Running Out of Food in the Last Year: Not on file    [employer] of [employer] in the Last Year: Not on file   Transportation Needs:     Lack of Transportation (Medical): Not on file    Lack of Transportation (Non-Medical):  Not on file   Physical Activity:     Days of Exercise per Week: Not on file    Minutes of Exercise per Session: Not on file   Stress:     Feeling of Stress : Not on file   Social Connections:     Frequency of Communication with Friends and Family: Not on file    Frequency of Social Gatherings with Friends and Family: Not on file    Attends Scientology Services: Not on file    Active Member of [employer] or Organizations: Not on file    Attends Club or Organization Meetings: Not on file    Marital Status: Not on file   Intimate Partner Violence:     Fear of Current or Ex-Partner: Not on file    Emotionally Abused: Not on file    Physically Abused: Not on file    Sexually Abused: Not on file   Housing Stability:     Unable to Pay for Housing in the Last Year: Not on file    Number of [place] in the Last Year: Not on file    Unstable Housing in the Last Year: Not on file     Allergies   Allergen Reactions    Celexa [Citalopram] Other (comments)     tinnitus    Cialis [Tadalafil] Other (comments)     Dizziness     Effexor [Venlafaxine] Other (comments)     Suppressed appetite     Levitra [Vardenafil] Other (comments)     Malaise     Prozac [Fluoxetine] Other (comments)     drowsiness    Sulfa (Sulfonamide Antibiotics) Other (comments)     Abdominal pain    Viagra [Sildenafil] Other (comments)     Tinnitus     Zoloft [Sertraline] Other (comments)     Confusion          Current Outpatient Medications   Medication Sig  diazePAM (VALIUM) 5 mg tablet TAKE 1 TABLET BY MOUTH 1 HOUR PRIOR TO PROCEDURE    atorvastatin (LIPITOR) 10 mg tablet Take 1 Tablet by mouth daily.  magnesium 200 mg tab Take  by mouth.  aspirin delayed-release 81 mg tablet Take 1 Tablet by mouth.  multivitamin (ONE A DAY) tablet Take 2 Tablets by mouth.  OTHER Magnesium Glycinate 400 mg qhs; Glucosamine Chondroitin , Zyrtec D . Aveed injections for hypogonadism    melatonin 3 mg tablet Take 3 mg by mouth nightly.  therapeutic multivitamin (THERAGRAN) tablet Take 1 Tab by mouth every other day.  DULoxetine (CYMBALTA) 20 mg capsule Take 1 Capsule by mouth daily. (Patient not taking: Reported on 3/16/2022)    sildenafil, antihypertensive, (REVATIO) 20 mg tablet Take 20 mg by mouth daily as needed. (Patient not taking: Reported on 3/16/2022)     No current facility-administered medications for this visit. Neurologic Exam     Mental Status   WD/WN adult in NAD, normal grooming  VSS  A&O x 3    PERRL, nonicteric  Face is masked  Speech is clear  No limb ataxia. No abnl movements. Moving all extemities spontaneously and symmetric  Normal gait           Visit Vitals  /84 (BP 1 Location: Right upper arm, BP Patient Position: Sitting)   Pulse 74   Ht 6' 1\" (1.854 m)   Wt 221 lb (100.2 kg)   SpO2 98%   BMI 29.16 kg/m²       Assessment and Plan   Diagnoses and all orders for this visit:    1. Attention and concentration deficit    2. Moderate anxiety    3. Mild major depression ([address]    4. Marital stress       56-year-old gentleman with history of TIA who needs to remain on aspirin lifelong. We discussed that. We also spent a great deal of the visit talking about his stressors and neuropsychological results. Fortunately there is no evidence to support dementia at this time but he does have moderate levels of anxiety and depression which he seems to have good insight into himself.   We talked about getting individual counseling and then possibly adding marriage counseling to include his wife. He needs to start individual counseling first for himself. Optimize self-care is much as possible. Go for walks. Optimize his sleep. I would like to see him in springtime 2023 so we can revisit. We can certainly reassess additional neuropsychological testing in the future if needed. I gave him referrals of local offices to do therapy and individual counseling. 30 minutes of time was spent in total today reviewing the medical record to include neuropsychological results,  face-to-face time discussing symptoms, and completion of documentation today. I reviewed and decided to continue the current medications. This clinical note was dictated with an electronic dictation software that can make unintentional errors. If there are any questions, please contact me directly for clarification.       2 [facility], [facility] [address]  Diplomate TELMAN

## 2022-07-13 ENCOUNTER — TELEPHONE (OUTPATIENT)
Dept: NEUROLOGY | Age: 67
End: 2022-07-13

## 2023-03-28 PROBLEM — E80.4 GILBERT'S SYNDROME: Status: ACTIVE | Noted: 2023-03-28

## 2023-06-06 ENCOUNTER — OFFICE VISIT (OUTPATIENT)
Age: 68
End: 2023-06-06
Payer: COMMERCIAL

## 2023-06-06 VITALS
HEIGHT: 73 IN | HEART RATE: 71 BPM | DIASTOLIC BLOOD PRESSURE: 73 MMHG | BODY MASS INDEX: 29.42 KG/M2 | SYSTOLIC BLOOD PRESSURE: 126 MMHG | WEIGHT: 222 LBS | OXYGEN SATURATION: 99 % | RESPIRATION RATE: 17 BRPM

## 2023-06-06 DIAGNOSIS — F33.0 MILD EPISODE OF RECURRENT MAJOR DEPRESSIVE DISORDER (HCC): ICD-10-CM

## 2023-06-06 DIAGNOSIS — Z86.73 PERSONAL HISTORY OF TRANSIENT ISCHEMIC ATTACK (TIA), AND CEREBRAL INFARCTION WITHOUT RESIDUAL DEFICITS: ICD-10-CM

## 2023-06-06 DIAGNOSIS — F41.1 GENERALIZED ANXIETY DISORDER: Primary | ICD-10-CM

## 2023-06-06 PROCEDURE — 99215 OFFICE O/P EST HI 40 MIN: CPT

## 2023-06-06 PROCEDURE — 1123F ACP DISCUSS/DSCN MKR DOCD: CPT

## 2023-06-06 RX ORDER — MECLIZINE HYDROCHLORIDE 25 MG/1
25 TABLET ORAL 3 TIMES DAILY PRN
COMMUNITY

## 2023-06-06 ASSESSMENT — PATIENT HEALTH QUESTIONNAIRE - PHQ9
2. FEELING DOWN, DEPRESSED OR HOPELESS: 0
SUM OF ALL RESPONSES TO PHQ QUESTIONS 1-9: 0
1. LITTLE INTEREST OR PLEASURE IN DOING THINGS: 0
SUM OF ALL RESPONSES TO PHQ9 QUESTIONS 1 & 2: 0
SUM OF ALL RESPONSES TO PHQ QUESTIONS 1-9: 0

## 2023-06-06 NOTE — PROGRESS NOTES
Chief Complaint   Patient presents with    Follow-up     Word finding issues    Memory Loss     TIA      Vitals:    06/06/23 1255   BP: 126/73   Pulse: 71   Resp: 17   SpO2: 99%
perfusion abnormality. 2. Negative CT angiography of the neck and head. CT HEAD WO CONTRAST 04/02/2021    Narrative  EXAM: CT CODE NEURO HEAD WO CONTRAST    INDICATION: dizziness, right side weakness lkw 10 pm last night    COMPARISON: None. CONTRAST: None. TECHNIQUE: Unenhanced CT of the head was performed using 5 mm images. Brain and  bone windows were generated. Coronal and sagittal reformats. CT dose reduction  was achieved through use of a standardized protocol tailored for this  examination and automatic exposure control for dose modulation. FINDINGS:  The ventricles and sulci are normal in size, shape and configuration. There is  mild white matter disease likely to chronic small vessel ischemic disease. There  is no intracranial hemorrhage, extra-axial collection, or mass effect. The  basilar cisterns are open. No CT evidence of acute infarct. The bone windows demonstrate no abnormalities. A mucus retention cyst is seen in  the inferior right maxillary sinus. .    Impression  No evidence of acute process. MRI Results (maximum last 3): MRI Result (most recent):  MRI BRAIN WO CONTRAST 04/03/2021    Narrative  EXAM: MRI BRAIN WO CONT    INDICATION: CVA    COMPARISON: CTA 4/20/2021. CONTRAST: None. TECHNIQUE:  Multiplanar multisequence acquisition without contrast of the brain. FINDINGS:  The ventricles are normal in size and position. There is mild white matter  disease likely to chronic small vessel ischemic disease There is no acute  infarct, hemorrhage, extra-axial fluid collection, or mass effect. There is no  cerebellar tonsillar herniation. Expected arterial flow-voids are present. There is a mucus retention cyst in the right maxillary sinus. The orbital  contents are within normal limits. No significant osseous or scalp lesions are  identified. Impression  Mild white matter disease likely related to chronic small vessel ischemic  disease.  No acute

## 2024-04-01 PROBLEM — F33.0 MILD EPISODE OF RECURRENT MAJOR DEPRESSIVE DISORDER (HCC): Status: ACTIVE | Noted: 2024-04-01

## 2024-04-01 PROBLEM — D69.6 THROMBOCYTOPENIA, UNSPECIFIED (HCC): Status: ACTIVE | Noted: 2024-04-01

## 2024-06-13 ENCOUNTER — OFFICE VISIT (OUTPATIENT)
Age: 69
End: 2024-06-13
Payer: MEDICARE

## 2024-06-13 VITALS
HEART RATE: 70 BPM | BODY MASS INDEX: 28.89 KG/M2 | RESPIRATION RATE: 17 BRPM | WEIGHT: 218 LBS | HEIGHT: 73 IN | DIASTOLIC BLOOD PRESSURE: 76 MMHG | SYSTOLIC BLOOD PRESSURE: 122 MMHG | OXYGEN SATURATION: 98 %

## 2024-06-13 DIAGNOSIS — G47.01 INSOMNIA DUE TO MEDICAL CONDITION: ICD-10-CM

## 2024-06-13 DIAGNOSIS — F41.1 GENERALIZED ANXIETY DISORDER: Primary | ICD-10-CM

## 2024-06-13 PROCEDURE — 1036F TOBACCO NON-USER: CPT

## 2024-06-13 PROCEDURE — 99214 OFFICE O/P EST MOD 30 MIN: CPT

## 2024-06-13 PROCEDURE — 1123F ACP DISCUSS/DSCN MKR DOCD: CPT

## 2024-06-13 PROCEDURE — G8427 DOCREV CUR MEDS BY ELIG CLIN: HCPCS

## 2024-06-13 PROCEDURE — G8419 CALC BMI OUT NRM PARAM NOF/U: HCPCS

## 2024-06-13 PROCEDURE — 3017F COLORECTAL CA SCREEN DOC REV: CPT

## 2024-06-13 ASSESSMENT — PATIENT HEALTH QUESTIONNAIRE - PHQ9
SUM OF ALL RESPONSES TO PHQ QUESTIONS 1-9: 0
SUM OF ALL RESPONSES TO PHQ QUESTIONS 1-9: 0
SUM OF ALL RESPONSES TO PHQ9 QUESTIONS 1 & 2: 0
2. FEELING DOWN, DEPRESSED OR HOPELESS: NOT AT ALL
SUM OF ALL RESPONSES TO PHQ QUESTIONS 1-9: 0
SUM OF ALL RESPONSES TO PHQ QUESTIONS 1-9: 0
1. LITTLE INTEREST OR PLEASURE IN DOING THINGS: NOT AT ALL

## 2024-06-13 NOTE — PROGRESS NOTES
Chief Complaint   Patient presents with    Follow-up     TIA.  Patient reports memory concerns.  Patient reports having difficulty sleeping.      Vitals:    06/13/24 1407   BP: 122/76   Pulse: 70   Resp: 17   SpO2: 98%      
nervous/anxious.          Past Medical History:   Diagnosis Date    Anxiety     ED (erectile dysfunction)     Gilbert's syndrome     Gout     Laceration     Lactose intolerance     Plantar fasciitis     Prostatitis     Trauma     rt eye injury      Family History   Problem Relation Age of Onset    Stroke Father         TIAs     Cancer Father         prostate    Heart Disease Father         Popliteal aneurysm.     Social History     Socioeconomic History    Marital status:      Spouse name: Not on file    Number of children: Not on file    Years of education: Not on file    Highest education level: Not on file   Occupational History    Not on file   Tobacco Use    Smoking status: Never    Smokeless tobacco: Never   Vaping Use    Vaping Use: Never used   Substance and Sexual Activity    Alcohol use: Yes    Drug use: No    Sexual activity: Not on file   Other Topics Concern    Not on file   Social History Narrative    Not on file     Social Determinants of Health     Financial Resource Strain: Not on file   Food Insecurity: Not on file   Transportation Needs: Not on file   Physical Activity: Insufficiently Active (4/1/2024)    Exercise Vital Sign     Days of Exercise per Week: 1 day     Minutes of Exercise per Session: 60 min   Stress: Not on file   Social Connections: Not on file   Intimate Partner Violence: Not on file   Housing Stability: Not on file     Allergies   Allergen Reactions    Citalopram Other (See Comments)     tinnitus    Fluoxetine Other (See Comments)     drowsiness    Sertraline Other (See Comments)     Confusion     Sildenafil Other (See Comments)     Tinnitus     Sulfa Antibiotics Other (See Comments)     Abdominal pain    Tadalafil Other (See Comments)     Dizziness     Vardenafil Other (See Comments)     Malaise     Venlafaxine Other (See Comments)     Suppressed appetite          Current Outpatient Medications   Medication Sig    azelastine (ASTELIN) 0.1 % nasal spray 1 spray by Nasal 
no chest pain and no edema.

## 2024-06-13 NOTE — PATIENT INSTRUCTIONS
Psychiatry [facility]  Address: [address] [place] [address]  Phone: [phone]    [facility] [place]   Address: [address], [place] [address]  Phone: [phone]    [facility] (In person/virtual)   [place] [facility]  Address: [address], [place] [address]  Phone: [phone]    [place] [facility]  Address: [address] [place] [address]  Phone: [phone]    [place] [facility]  Address: [address], [place] [address]  Phone: [phone]    [place] [facility]  Address: [address], [place] [address]  Phone: [phone]    [place] [facility]  Address: [address], [place] [address]  Phone: [phone]    [facility]  Address: [address], [place] [address]  Phone: [phone]    [facility]  [facility]  [address] [place]  Phone: [phone]    [place]  [address]  [place], [place]  Phone: [phone]    [facility]  Address: [address] [place] [address]  Phone: [phone]    [facility]  Address: [address], [place] [address]  Phone: [phone]    [facility]  Address: [address], [place] [address]  Phone: [phone]    [facility], [facility]  Address: [address], [place] [address]  Phone: [phone]    [facility], [facility]  Address: [address] [place] [address]  Phone: [phone]